# Patient Record
Sex: MALE | Race: WHITE | NOT HISPANIC OR LATINO | Employment: FULL TIME | URBAN - METROPOLITAN AREA
[De-identification: names, ages, dates, MRNs, and addresses within clinical notes are randomized per-mention and may not be internally consistent; named-entity substitution may affect disease eponyms.]

---

## 2017-04-21 ENCOUNTER — CLINICAL SUPPORT (OUTPATIENT)
Dept: INTERNAL MEDICINE | Facility: CLINIC | Age: 37
End: 2017-04-21
Payer: COMMERCIAL

## 2017-04-21 ENCOUNTER — OFFICE VISIT (OUTPATIENT)
Dept: INTERNAL MEDICINE | Facility: CLINIC | Age: 37
End: 2017-04-21

## 2017-04-21 VITALS
RESPIRATION RATE: 16 BRPM | SYSTOLIC BLOOD PRESSURE: 102 MMHG | TEMPERATURE: 97 F | HEART RATE: 62 BPM | HEIGHT: 65 IN | BODY MASS INDEX: 26.08 KG/M2 | DIASTOLIC BLOOD PRESSURE: 66 MMHG | WEIGHT: 156.5 LBS

## 2017-04-21 DIAGNOSIS — Z00.00 ANNUAL PHYSICAL EXAM: Primary | ICD-10-CM

## 2017-04-21 DIAGNOSIS — Z00.00 ROUTINE GENERAL MEDICAL EXAMINATION AT A HEALTH CARE FACILITY: Primary | ICD-10-CM

## 2017-04-21 LAB
ALBUMIN SERPL BCP-MCNC: 3.6 G/DL
ALP SERPL-CCNC: 69 U/L
ALT SERPL W/O P-5'-P-CCNC: 31 U/L
ANION GAP SERPL CALC-SCNC: 9 MMOL/L
AST SERPL-CCNC: 23 U/L
BILIRUB SERPL-MCNC: 0.6 MG/DL
BUN SERPL-MCNC: 13 MG/DL
CALCIUM SERPL-MCNC: 9.1 MG/DL
CHLORIDE SERPL-SCNC: 103 MMOL/L
CHOLEST/HDLC SERPL: 3.6 {RATIO}
CO2 SERPL-SCNC: 28 MMOL/L
CREAT SERPL-MCNC: 1.1 MG/DL
ERYTHROCYTE [DISTWIDTH] IN BLOOD BY AUTOMATED COUNT: 12.5 %
EST. GFR  (AFRICAN AMERICAN): >60 ML/MIN/1.73 M^2
EST. GFR  (NON AFRICAN AMERICAN): >60 ML/MIN/1.73 M^2
GLUCOSE SERPL-MCNC: 87 MG/DL
HCT VFR BLD AUTO: 44 %
HDL/CHOLESTEROL RATIO: 28 %
HDLC SERPL-MCNC: 100 MG/DL
HDLC SERPL-MCNC: 28 MG/DL
HGB BLD-MCNC: 15 G/DL
LDLC SERPL CALC-MCNC: 55.6 MG/DL
MCH RBC QN AUTO: 33.6 PG
MCHC RBC AUTO-ENTMCNC: 34.1 %
MCV RBC AUTO: 98 FL
NONHDLC SERPL-MCNC: 72 MG/DL
PLATELET # BLD AUTO: 166 K/UL
PMV BLD AUTO: 10.2 FL
POTASSIUM SERPL-SCNC: 3.7 MMOL/L
PROT SERPL-MCNC: 6.7 G/DL
RBC # BLD AUTO: 4.47 M/UL
SODIUM SERPL-SCNC: 140 MMOL/L
TRIGL SERPL-MCNC: 82 MG/DL
WBC # BLD AUTO: 5.43 K/UL

## 2017-04-21 PROCEDURE — 80053 COMPREHEN METABOLIC PANEL: CPT | Mod: PO

## 2017-04-21 PROCEDURE — 99396 PREV VISIT EST AGE 40-64: CPT | Mod: ,,, | Performed by: FAMILY MEDICINE

## 2017-04-21 PROCEDURE — 80061 LIPID PANEL: CPT | Mod: PO

## 2017-04-21 PROCEDURE — 99999 PR PBB SHADOW E&M-EST. PATIENT-LVL III: CPT | Mod: PBBFAC,,, | Performed by: FAMILY MEDICINE

## 2017-04-21 PROCEDURE — 85027 COMPLETE CBC AUTOMATED: CPT | Mod: PO

## 2017-04-21 PROCEDURE — 83036 HEMOGLOBIN GLYCOSYLATED A1C: CPT

## 2017-04-21 NOTE — PROGRESS NOTES
Subjective:       Patient ID: Ric Ramos III is a 37 y.o. male.    Chief Complaint: Executive Health and Generalized Body Aches    HPI Comments: Annual exam:       Pt is here for executive physical. Pt is a healthy male. Has at recent bout of stomach upset after eating Chinese food. No vomiting or BM problems.    Review of Systems   Constitutional: Negative.    HENT: Negative.    Respiratory: Negative.    Cardiovascular: Negative.    Gastrointestinal: Negative.    Skin: Negative.    Neurological: Negative.    Psychiatric/Behavioral: Negative.        Objective:      Physical Exam   Constitutional: He is oriented to person, place, and time. He appears well-developed and well-nourished.   HENT:   Head: Normocephalic.   Eyes: EOM are normal. Pupils are equal, round, and reactive to light.   Neck: Normal range of motion. Neck supple. No JVD present. No thyromegaly present.   Cardiovascular: Normal rate and regular rhythm.    Pulmonary/Chest: Effort normal and breath sounds normal.   Abdominal: Soft. Bowel sounds are normal.   Musculoskeletal: Normal range of motion.   Lymphadenopathy:     He has no cervical adenopathy.   Neurological: He is alert and oriented to person, place, and time. He has normal reflexes.   Skin: Skin is warm and dry.   Psychiatric: He has a normal mood and affect. His behavior is normal.       Assessment:       1. Annual physical exam        Plan:       Annual physical exam  Comments:  Pt is generally good healthy

## 2017-04-21 NOTE — MR AVS SNAPSHOT
"    [facility]  [address] [facility] [address] [place] [address]  Phone: [phone]  Fax: [phone]                  Ric Ramos III   2017 11:40 AM   Office Visit    Description:  Male : 1980   Provider:  Orlin Olsen MD   Department:  [facility]           Reason for Visit     Executive Health     Generalized Body Aches           Diagnoses this Visit        Comments    Annual physical exam    -  Primary Pt is generally good healthy           To Do List           Future Appointments        Provider Department Dept Phone    2017 11:40 AM Orlin Olsen MD [facility] Internal Medicine [phone]      Goals (5 Years of Data)     None      [facility]     [facility] On Call Nurse Care Line -  Assistance  Unless otherwise directed by your provider, please contact [facility], our nurse care line that is available for  assistance.     Registered nurses in the [facility] On Call Center provide: appointment scheduling, clinical advisement, health education, and other advisory services.  Call: [phone] (toll free)               Medications           Message regarding Medications     Verify the changes and/or additions to your medication regime listed below are the same as discussed with your clinician today.  If any of these changes or additions are incorrect, please notify your healthcare provider.             Verify that the below list of medications is an accurate representation of the medications you are currently taking.  If none reported, the list may be blank. If incorrect, please contact your healthcare provider. Carry this list with you in case of emergency.                Clinical Reference Information           Your Vitals Were     BP Pulse Temp Resp Height Weight    102/66 62 96.6 °F (35.9 °C) (Tympanic) 16 5' 5" (1.651 m) 71 kg (156 lb 8.4 oz)    BMI                26.05 kg/m2          Blood Pressure          Most Recent Value    BP  " 102/66      Allergies as of 4/21/2017     No Known Allergies      Immunizations Administered on Date of Encounter - 4/21/2017     None      [facility] Sign-Up     Activating your [facility] account is as easy as 1-2-3!     1) Visit [email or web address], select Sign Up Now, enter this activation code and your date of birth, then select Next.  [email or web address]  Expires: 6/5/2017 10:53 AM      2) Create a username and password to use when you visit [facility] in the future and select a security question in case you lose your password and select Next.    3) Enter your e-mail address and click Sign Up!    Additional Information  If you have questions, please e-mail [email or web address] or call [phone] to talk to our [facility] staff. Remember, [facility] is NOT to be used for urgent needs. For medical emergencies, dial 911.         Language Assistance Services     ATTENTION: Language assistance services are available, free of charge. Please call [phone].      ATENCIÓN: Si habla español, tiene a tyler disposición servicios gratuitos de asistencia lingüística. Llame al [phone].     CHÚ Ý: N?u b?n nói Ti?ng Vi?t, có các d?ch v? h? tr? ngôn ng? mi?n phí dành cho b?n. G?i s? [phone].         [facility] complies with applicable Federal civil rights laws and does not discriminate on the basis of race, color, national origin, age, disability, or sex.

## 2017-04-22 LAB
ESTIMATED AVG GLUCOSE: 103 MG/DL
HBA1C MFR BLD HPLC: 5.2 %

## 2017-07-24 PROBLEM — Z00.00 ANNUAL PHYSICAL EXAM: Status: RESOLVED | Noted: 2017-04-21 | Resolved: 2017-07-24

## 2018-01-22 DIAGNOSIS — Z00.00 ROUTINE GENERAL MEDICAL EXAMINATION AT A HEALTH CARE FACILITY: Primary | ICD-10-CM

## 2018-03-29 ENCOUNTER — OFFICE VISIT (OUTPATIENT)
Dept: INTERNAL MEDICINE | Facility: CLINIC | Age: 38
End: 2018-03-29

## 2018-03-29 ENCOUNTER — CLINICAL SUPPORT (OUTPATIENT)
Dept: CARDIOLOGY | Facility: CLINIC | Age: 38
End: 2018-03-29

## 2018-03-29 ENCOUNTER — CLINICAL SUPPORT (OUTPATIENT)
Dept: INTERNAL MEDICINE | Facility: CLINIC | Age: 38
End: 2018-03-29
Payer: COMMERCIAL

## 2018-03-29 VITALS
HEART RATE: 58 BPM | BODY MASS INDEX: 25.71 KG/M2 | SYSTOLIC BLOOD PRESSURE: 116 MMHG | WEIGHT: 154.31 LBS | DIASTOLIC BLOOD PRESSURE: 78 MMHG | BODY MASS INDEX: 25.71 KG/M2 | HEIGHT: 65 IN | HEART RATE: 58 BPM | SYSTOLIC BLOOD PRESSURE: 116 MMHG | TEMPERATURE: 97 F | RESPIRATION RATE: 16 BRPM | WEIGHT: 154.31 LBS | HEIGHT: 65 IN | DIASTOLIC BLOOD PRESSURE: 78 MMHG

## 2018-03-29 DIAGNOSIS — Z00.00 ANNUAL PHYSICAL EXAM: Primary | ICD-10-CM

## 2018-03-29 DIAGNOSIS — Z00.00 ROUTINE GENERAL MEDICAL EXAMINATION AT A HEALTH CARE FACILITY: ICD-10-CM

## 2018-03-29 DIAGNOSIS — Z00.00 ROUTINE GENERAL MEDICAL EXAMINATION AT A HEALTH CARE FACILITY: Primary | ICD-10-CM

## 2018-03-29 LAB
ALBUMIN SERPL BCP-MCNC: 3.9 G/DL
ALP SERPL-CCNC: 64 U/L
ALT SERPL W/O P-5'-P-CCNC: 41 U/L
ANION GAP SERPL CALC-SCNC: 11 MMOL/L
AST SERPL-CCNC: 47 U/L
BILIRUB SERPL-MCNC: 1 MG/DL
BUN SERPL-MCNC: 14 MG/DL
CALCIUM SERPL-MCNC: 10 MG/DL
CHLORIDE SERPL-SCNC: 104 MMOL/L
CHOLEST SERPL-MCNC: 147 MG/DL
CHOLEST/HDLC SERPL: 3.7 {RATIO}
CO2 SERPL-SCNC: 29 MMOL/L
CREAT SERPL-MCNC: 1.1 MG/DL
ERYTHROCYTE [DISTWIDTH] IN BLOOD BY AUTOMATED COUNT: 12.6 %
EST. GFR  (AFRICAN AMERICAN): >60 ML/MIN/1.73 M^2
EST. GFR  (NON AFRICAN AMERICAN): >60 ML/MIN/1.73 M^2
ESTIMATED AVG GLUCOSE: 94 MG/DL
GLUCOSE SERPL-MCNC: 91 MG/DL
HBA1C MFR BLD HPLC: 4.9 %
HCT VFR BLD AUTO: 44.4 %
HDLC SERPL-MCNC: 40 MG/DL
HDLC SERPL: 27.2 %
HGB BLD-MCNC: 15.5 G/DL
LDLC SERPL CALC-MCNC: 91.4 MG/DL
MCH RBC QN AUTO: 34.1 PG
MCHC RBC AUTO-ENTMCNC: 34.9 G/DL
MCV RBC AUTO: 98 FL
NONHDLC SERPL-MCNC: 107 MG/DL
PLATELET # BLD AUTO: 234 K/UL
PMV BLD AUTO: 9.9 FL
POTASSIUM SERPL-SCNC: 4.2 MMOL/L
PROT SERPL-MCNC: 7.1 G/DL
RBC # BLD AUTO: 4.54 M/UL
SODIUM SERPL-SCNC: 144 MMOL/L
TRIGL SERPL-MCNC: 78 MG/DL
WBC # BLD AUTO: 6.52 K/UL

## 2018-03-29 PROCEDURE — 99396 PREV VISIT EST AGE 40-64: CPT | Mod: ,,, | Performed by: FAMILY MEDICINE

## 2018-03-29 PROCEDURE — 85027 COMPLETE CBC AUTOMATED: CPT | Mod: PO

## 2018-03-29 PROCEDURE — 93000 ELECTROCARDIOGRAM COMPLETE: CPT | Mod: ,,, | Performed by: INTERNAL MEDICINE

## 2018-03-29 PROCEDURE — 99999 PR PBB SHADOW E&M-EST. PATIENT-LVL III: CPT | Mod: PBBFAC,,, | Performed by: FAMILY MEDICINE

## 2018-03-29 PROCEDURE — 83036 HEMOGLOBIN GLYCOSYLATED A1C: CPT

## 2018-03-29 PROCEDURE — 80053 COMPREHEN METABOLIC PANEL: CPT | Mod: PO

## 2018-03-29 PROCEDURE — 80061 LIPID PANEL: CPT | Mod: PO

## 2018-03-29 NOTE — PROGRESS NOTES
Subjective:       Patient ID: Ric Ramos III is a 38 y.o. male.    Chief Complaint: Annual Exam    Annual exam:       Pt is a 38 year old healthy male. Pt has no medical issues      Review of Systems   Constitutional: Negative.    HENT: Negative.    Respiratory: Negative.    Cardiovascular: Negative.    Gastrointestinal: Negative.    Genitourinary: Negative.    Skin: Negative.    Neurological: Negative.    Psychiatric/Behavioral: Negative.        Objective:      Physical Exam   Constitutional: He is oriented to person, place, and time. He appears well-developed and well-nourished.   HENT:   Head: Normocephalic.   Eyes: EOM are normal. Pupils are equal, round, and reactive to light.   Neck: Normal range of motion. Neck supple. No JVD present. No thyromegaly present.   Cardiovascular: Normal rate and regular rhythm.    No murmur heard.  Pulmonary/Chest: Effort normal and breath sounds normal.   Abdominal: Soft. Bowel sounds are normal.   Musculoskeletal: Normal range of motion.   Lymphadenopathy:     He has no cervical adenopathy.   Neurological: He is alert and oriented to person, place, and time. He has normal reflexes.   Skin: Skin is warm and dry.   Psychiatric: He has a normal mood and affect. His behavior is normal.       Assessment:       1. Annual physical exam        Plan:       Annual physical exam  Comments:  Pt is generally good health and doing well

## 2018-07-02 PROBLEM — Z00.00 ANNUAL PHYSICAL EXAM: Status: RESOLVED | Noted: 2017-04-21 | Resolved: 2018-07-02

## 2019-07-12 ENCOUNTER — CLINICAL SUPPORT (OUTPATIENT)
Dept: INTERNAL MEDICINE | Facility: CLINIC | Age: 39
End: 2019-07-12

## 2019-07-12 ENCOUNTER — OFFICE VISIT (OUTPATIENT)
Dept: INTERNAL MEDICINE | Facility: CLINIC | Age: 39
End: 2019-07-12

## 2019-07-12 VITALS
WEIGHT: 156.5 LBS | SYSTOLIC BLOOD PRESSURE: 108 MMHG | BODY MASS INDEX: 26.08 KG/M2 | HEIGHT: 65 IN | RESPIRATION RATE: 16 BRPM | HEART RATE: 56 BPM | TEMPERATURE: 97 F | DIASTOLIC BLOOD PRESSURE: 73 MMHG

## 2019-07-12 VITALS
RESPIRATION RATE: 16 BRPM | HEART RATE: 56 BPM | BODY MASS INDEX: 26.08 KG/M2 | SYSTOLIC BLOOD PRESSURE: 108 MMHG | WEIGHT: 156.5 LBS | HEIGHT: 65 IN | DIASTOLIC BLOOD PRESSURE: 73 MMHG

## 2019-07-12 DIAGNOSIS — Z00.00 ANNUAL PHYSICAL EXAM: Primary | ICD-10-CM

## 2019-07-12 DIAGNOSIS — Z00.00 ROUTINE GENERAL MEDICAL EXAMINATION AT A HEALTH CARE FACILITY: Primary | ICD-10-CM

## 2019-07-12 LAB
ALBUMIN SERPL BCP-MCNC: 4 G/DL (ref 3.5–5.2)
ALP SERPL-CCNC: 57 U/L (ref 55–135)
ALT SERPL W/O P-5'-P-CCNC: 31 U/L (ref 10–44)
ANION GAP SERPL CALC-SCNC: 8 MMOL/L (ref 8–16)
AST SERPL-CCNC: 31 U/L (ref 10–40)
BILIRUB SERPL-MCNC: 1.3 MG/DL (ref 0.1–1)
BUN SERPL-MCNC: 17 MG/DL (ref 6–20)
CALCIUM SERPL-MCNC: 10.1 MG/DL (ref 8.7–10.5)
CHLORIDE SERPL-SCNC: 105 MMOL/L (ref 95–110)
CHOLEST SERPL-MCNC: 165 MG/DL (ref 120–199)
CHOLEST/HDLC SERPL: 3.4 {RATIO} (ref 2–5)
CO2 SERPL-SCNC: 28 MMOL/L (ref 23–29)
CREAT SERPL-MCNC: 1.2 MG/DL (ref 0.5–1.4)
ERYTHROCYTE [DISTWIDTH] IN BLOOD BY AUTOMATED COUNT: 12.4 % (ref 11.5–14.5)
EST. GFR  (AFRICAN AMERICAN): >60 ML/MIN/1.73 M^2
EST. GFR  (NON AFRICAN AMERICAN): >60 ML/MIN/1.73 M^2
ESTIMATED AVG GLUCOSE: 94 MG/DL (ref 68–131)
GLUCOSE SERPL-MCNC: 78 MG/DL (ref 70–110)
HBA1C MFR BLD HPLC: 4.9 % (ref 4–5.6)
HCT VFR BLD AUTO: 43.8 % (ref 40–54)
HDLC SERPL-MCNC: 48 MG/DL (ref 40–75)
HDLC SERPL: 29.1 % (ref 20–50)
HGB BLD-MCNC: 15.1 G/DL (ref 14–18)
LDLC SERPL CALC-MCNC: 102 MG/DL (ref 63–159)
MCH RBC QN AUTO: 34.4 PG (ref 27–31)
MCHC RBC AUTO-ENTMCNC: 34.5 G/DL (ref 32–36)
MCV RBC AUTO: 100 FL (ref 82–98)
NONHDLC SERPL-MCNC: 117 MG/DL
PLATELET # BLD AUTO: 211 K/UL (ref 150–350)
PMV BLD AUTO: 10.2 FL (ref 9.2–12.9)
POTASSIUM SERPL-SCNC: 3.6 MMOL/L (ref 3.5–5.1)
PROT SERPL-MCNC: 7.2 G/DL (ref 6–8.4)
RBC # BLD AUTO: 4.39 M/UL (ref 4.6–6.2)
SODIUM SERPL-SCNC: 141 MMOL/L (ref 136–145)
TRIGL SERPL-MCNC: 75 MG/DL (ref 30–150)
TSH SERPL DL<=0.005 MIU/L-ACNC: 2.36 UIU/ML (ref 0.4–4)
WBC # BLD AUTO: 5.61 K/UL (ref 3.9–12.7)

## 2019-07-12 PROCEDURE — 99999 PR PBB SHADOW E&M-EST. PATIENT-LVL III: ICD-10-PCS | Mod: PBBFAC,,, | Performed by: FAMILY MEDICINE

## 2019-07-12 PROCEDURE — 85027 COMPLETE CBC AUTOMATED: CPT

## 2019-07-12 PROCEDURE — 80061 LIPID PANEL: CPT

## 2019-07-12 PROCEDURE — 84443 ASSAY THYROID STIM HORMONE: CPT

## 2019-07-12 PROCEDURE — 99999 PR PBB SHADOW E&M-EST. PATIENT-LVL III: CPT | Mod: PBBFAC,,, | Performed by: FAMILY MEDICINE

## 2019-07-12 PROCEDURE — 99386 PR PREVENTIVE VISIT,NEW,40-64: ICD-10-PCS | Mod: S$GLB,,, | Performed by: FAMILY MEDICINE

## 2019-07-12 PROCEDURE — 83036 HEMOGLOBIN GLYCOSYLATED A1C: CPT

## 2019-07-12 PROCEDURE — 99386 PREV VISIT NEW AGE 40-64: CPT | Mod: S$GLB,,, | Performed by: FAMILY MEDICINE

## 2019-07-12 PROCEDURE — 80053 COMPREHEN METABOLIC PANEL: CPT

## 2019-07-12 RX ORDER — ESZOPICLONE 2 MG/1
TABLET, FILM COATED ORAL
COMMUNITY
Start: 2018-04-09 | End: 2021-07-06

## 2019-10-14 PROBLEM — Z00.00 ANNUAL PHYSICAL EXAM: Status: RESOLVED | Noted: 2017-04-21 | Resolved: 2019-10-14

## 2020-02-26 DIAGNOSIS — Z91.89 AT RISK FOR CORONARY ARTERY DISEASE: ICD-10-CM

## 2020-02-26 DIAGNOSIS — Z00.00 ROUTINE GENERAL MEDICAL EXAMINATION AT A HEALTH CARE FACILITY: Primary | ICD-10-CM

## 2020-06-01 ENCOUNTER — OFFICE VISIT (OUTPATIENT)
Dept: INTERNAL MEDICINE | Facility: CLINIC | Age: 40
End: 2020-06-01

## 2020-06-01 ENCOUNTER — HOSPITAL ENCOUNTER (OUTPATIENT)
Dept: CARDIOLOGY | Facility: HOSPITAL | Age: 40
Discharge: HOME | End: 2020-06-01

## 2020-06-01 ENCOUNTER — CLINICAL SUPPORT (OUTPATIENT)
Dept: INTERNAL MEDICINE | Facility: CLINIC | Age: 40
End: 2020-06-01
Payer: COMMERCIAL

## 2020-06-01 VITALS
WEIGHT: 163.13 LBS | OXYGEN SATURATION: 98 % | DIASTOLIC BLOOD PRESSURE: 69 MMHG | HEIGHT: 65 IN | SYSTOLIC BLOOD PRESSURE: 116 MMHG | HEART RATE: 56 BPM | BODY MASS INDEX: 27.18 KG/M2 | TEMPERATURE: 97 F

## 2020-06-01 DIAGNOSIS — Z00.00 ANNUAL PHYSICAL EXAM: Primary | ICD-10-CM

## 2020-06-01 DIAGNOSIS — Z00.00 ROUTINE GENERAL MEDICAL EXAMINATION AT A HEALTH CARE FACILITY: ICD-10-CM

## 2020-06-01 DIAGNOSIS — R94.31 EKG ABNORMALITY: ICD-10-CM

## 2020-06-01 DIAGNOSIS — Z00.00 ROUTINE GENERAL MEDICAL EXAMINATION AT A HEALTH CARE FACILITY: Primary | ICD-10-CM

## 2020-06-01 LAB
ALBUMIN SERPL BCP-MCNC: 3.8 G/DL (ref 3.5–5.2)
ALP SERPL-CCNC: 50 U/L (ref 55–135)
ALT SERPL W/O P-5'-P-CCNC: 44 U/L (ref 10–44)
ANION GAP SERPL CALC-SCNC: 9 MMOL/L (ref 8–16)
AST SERPL-CCNC: 35 U/L (ref 10–40)
BILIRUB SERPL-MCNC: 0.6 MG/DL (ref 0.1–1)
BUN SERPL-MCNC: 19 MG/DL (ref 6–20)
CALCIUM SERPL-MCNC: 9.4 MG/DL (ref 8.7–10.5)
CHLORIDE SERPL-SCNC: 104 MMOL/L (ref 95–110)
CHOLEST SERPL-MCNC: 171 MG/DL (ref 120–199)
CHOLEST/HDLC SERPL: 3.6 {RATIO} (ref 2–5)
CO2 SERPL-SCNC: 28 MMOL/L (ref 23–29)
CREAT SERPL-MCNC: 1.2 MG/DL (ref 0.5–1.4)
ERYTHROCYTE [DISTWIDTH] IN BLOOD BY AUTOMATED COUNT: 12.6 % (ref 11.5–14.5)
EST. GFR  (AFRICAN AMERICAN): >60 ML/MIN/1.73 M^2
EST. GFR  (NON AFRICAN AMERICAN): >60 ML/MIN/1.73 M^2
ESTIMATED AVG GLUCOSE: 97 MG/DL (ref 68–131)
GLUCOSE SERPL-MCNC: 83 MG/DL (ref 70–110)
HBA1C MFR BLD HPLC: 5 % (ref 4–5.6)
HCT VFR BLD AUTO: 36.5 % (ref 40–54)
HDLC SERPL-MCNC: 47 MG/DL (ref 40–75)
HDLC SERPL: 27.5 % (ref 20–50)
HGB BLD-MCNC: 11.7 G/DL (ref 14–18)
LDLC SERPL CALC-MCNC: 106.6 MG/DL (ref 63–159)
MCH RBC QN AUTO: 29.3 PG (ref 27–31)
MCHC RBC AUTO-ENTMCNC: 32.1 G/DL (ref 32–36)
MCV RBC AUTO: 91 FL (ref 82–98)
NONHDLC SERPL-MCNC: 124 MG/DL
PLATELET # BLD AUTO: 267 K/UL (ref 150–350)
PMV BLD AUTO: 9.8 FL (ref 9.2–12.9)
POTASSIUM SERPL-SCNC: 3.9 MMOL/L (ref 3.5–5.1)
PROT SERPL-MCNC: 6.8 G/DL (ref 6–8.4)
RBC # BLD AUTO: 4 M/UL (ref 4.6–6.2)
SODIUM SERPL-SCNC: 141 MMOL/L (ref 136–145)
TRIGL SERPL-MCNC: 87 MG/DL (ref 30–150)
TSH SERPL DL<=0.005 MIU/L-ACNC: 2.5 UIU/ML (ref 0.4–4)
WBC # BLD AUTO: 4.66 K/UL (ref 3.9–12.7)

## 2020-06-01 PROCEDURE — 93005 ELECTROCARDIOGRAM TRACING: CPT

## 2020-06-01 PROCEDURE — 93010 ELECTROCARDIOGRAM REPORT: CPT | Mod: ,,, | Performed by: INTERNAL MEDICINE

## 2020-06-01 PROCEDURE — 80061 LIPID PANEL: CPT

## 2020-06-01 PROCEDURE — 93010 EKG 12-LEAD: ICD-10-PCS | Mod: ,,, | Performed by: INTERNAL MEDICINE

## 2020-06-01 PROCEDURE — 99385 PREV VISIT NEW AGE 18-39: CPT | Mod: S$GLB,,, | Performed by: FAMILY MEDICINE

## 2020-06-01 PROCEDURE — 80053 COMPREHEN METABOLIC PANEL: CPT

## 2020-06-01 PROCEDURE — 99999 PR PBB SHADOW E&M-EST. PATIENT-LVL III: CPT | Mod: PBBFAC,,, | Performed by: FAMILY MEDICINE

## 2020-06-01 PROCEDURE — 84443 ASSAY THYROID STIM HORMONE: CPT

## 2020-06-01 PROCEDURE — 85027 COMPLETE CBC AUTOMATED: CPT

## 2020-06-01 PROCEDURE — 83036 HEMOGLOBIN GLYCOSYLATED A1C: CPT

## 2020-06-01 PROCEDURE — 99999 PR PBB SHADOW E&M-EST. PATIENT-LVL III: ICD-10-PCS | Mod: PBBFAC,,, | Performed by: FAMILY MEDICINE

## 2020-06-01 PROCEDURE — 99385 PR PREVENTIVE VISIT,NEW,18-39: ICD-10-PCS | Mod: S$GLB,,, | Performed by: FAMILY MEDICINE

## 2020-06-01 NOTE — PROGRESS NOTES
Subjective:       Patient ID: Ric Ramos III is a 40 y.o. male.    Chief Complaint: Executive Health    Pt is a 40 year old who is generally healthy without any major medical issues.    Review of Systems   Constitutional: Negative.    Respiratory: Negative.    Cardiovascular: Negative.    Gastrointestinal: Negative.    Musculoskeletal: Negative.    Psychiatric/Behavioral: Negative.        Objective:      Physical Exam   Constitutional: He is oriented to person, place, and time. He appears well-developed and well-nourished.   HENT:   Head: Normocephalic.   Eyes: Pupils are equal, round, and reactive to light. EOM are normal.   Neck: Normal range of motion. Neck supple. No JVD present. No thyromegaly present.   Cardiovascular: Normal rate and regular rhythm.   Pulmonary/Chest: Effort normal and breath sounds normal.   Abdominal: Soft. Bowel sounds are normal.   Musculoskeletal: Normal range of motion.   Lymphadenopathy:     He has no cervical adenopathy.   Neurological: He is alert and oriented to person, place, and time. He has normal reflexes.   Skin: Skin is warm and dry.   Psychiatric: He has a normal mood and affect. His behavior is normal.       Assessment:       1. Annual physical exam    2. EKG abnormality        Plan:       Annual physical exam  Comments:  Over all very healthy    EKG abnormality  Comments:  Think EKG is over read.Repol with a very slow HR. Will consult Cardiology

## 2020-06-01 NOTE — Clinical Note
Pt is a healthy 40 year old who has on multiple occassions had incomplete right bundle. Cross-fit in great shape. No symptoms. This EKG shows ST -elevations, repol vs pericarditis. Think this is simply over read with HR so low likely repol. Do you feel further eval?

## 2020-06-02 ENCOUNTER — TELEPHONE (OUTPATIENT)
Dept: INTERNAL MEDICINE | Facility: CLINIC | Age: 40
End: 2020-06-02

## 2020-06-02 NOTE — TELEPHONE ENCOUNTER
I s/w pt informing him that Dr. Olsen spoke with cardiology and they stated no concerns at this time. //BJ

## 2020-06-02 NOTE — TELEPHONE ENCOUNTER
----- Message from Orlin Olsen MD sent at 6/2/2020  8:59 AM CDT -----  Inform pt that cardiology feels no concerns  ----- Message -----  From: Elio Perez MD  Sent: 6/2/2020   6:45 AM CDT  To: Orlin Olsen MD    Should be ok    ----- Message -----  From: Orlin Olsen MD  Sent: 6/2/2020   3:31 AM CDT  To: Elio Perez MD    None, Dad is 70 with heart issues but nothing early onset  ----- Message -----  From: Elio Perez MD  Sent: 6/1/2020  10:06 PM CDT  To: Orlin Olsen MD    THE V2 OF EKG LOOKS FUNNY. CAN NOT R/O TYPE III BRUGADA.  ANY F/H PREMATURE SCD?  ----- Message -----  From: Orlin Olsen MD  Sent: 6/1/2020   8:37 AM CDT  To: Elio Perez MD    Pt is a healthy 40 year old who has on multiple occassions had incomplete right bundle. Cross-fit in great shape. No symptoms. This EKG shows ST -elevations, repol vs pericarditis. Think this is simply over read with HR so low likely repol. Do you feel further eval?

## 2020-08-31 PROBLEM — Z00.00 ANNUAL PHYSICAL EXAM: Status: RESOLVED | Noted: 2017-04-21 | Resolved: 2020-08-31

## 2021-05-10 ENCOUNTER — PATIENT MESSAGE (OUTPATIENT)
Dept: RESEARCH | Facility: HOSPITAL | Age: 41
End: 2021-05-10

## 2021-06-14 DIAGNOSIS — Z00.00 ROUTINE GENERAL MEDICAL EXAMINATION AT A HEALTH CARE FACILITY: Primary | ICD-10-CM

## 2021-07-06 ENCOUNTER — CLINICAL SUPPORT (OUTPATIENT)
Dept: INTERNAL MEDICINE | Facility: CLINIC | Age: 41
End: 2021-07-06

## 2021-07-06 ENCOUNTER — CLINICAL SUPPORT (OUTPATIENT)
Dept: INTERNAL MEDICINE | Facility: CLINIC | Age: 41
End: 2021-07-06
Payer: COMMERCIAL

## 2021-07-06 ENCOUNTER — HOSPITAL ENCOUNTER (OUTPATIENT)
Dept: CARDIOLOGY | Facility: HOSPITAL | Age: 41
Discharge: HOME | End: 2021-07-06
Attending: FAMILY MEDICINE

## 2021-07-06 ENCOUNTER — OFFICE VISIT (OUTPATIENT)
Dept: INTERNAL MEDICINE | Facility: CLINIC | Age: 41
End: 2021-07-06

## 2021-07-06 VITALS
BODY MASS INDEX: 26.45 KG/M2 | HEIGHT: 65 IN | TEMPERATURE: 97 F | DIASTOLIC BLOOD PRESSURE: 77 MMHG | WEIGHT: 158.75 LBS | OXYGEN SATURATION: 98 % | HEART RATE: 69 BPM | SYSTOLIC BLOOD PRESSURE: 127 MMHG

## 2021-07-06 DIAGNOSIS — Z28.21 IMMUNIZATION NOT CARRIED OUT BECAUSE OF PATIENT REFUSAL: ICD-10-CM

## 2021-07-06 DIAGNOSIS — Z11.59 ENCOUNTER FOR HEPATITIS C SCREENING TEST FOR LOW RISK PATIENT: ICD-10-CM

## 2021-07-06 DIAGNOSIS — I45.10 INCOMPLETE RIGHT BUNDLE BRANCH BLOCK (RBBB): ICD-10-CM

## 2021-07-06 DIAGNOSIS — Z00.00 PREVENTATIVE HEALTH CARE: Primary | ICD-10-CM

## 2021-07-06 DIAGNOSIS — Z23 NEED FOR DIPHTHERIA-TETANUS-PERTUSSIS (TDAP) VACCINE: ICD-10-CM

## 2021-07-06 DIAGNOSIS — Z00.00 ROUTINE GENERAL MEDICAL EXAMINATION AT A HEALTH CARE FACILITY: ICD-10-CM

## 2021-07-06 DIAGNOSIS — Z00.00 PREVENTATIVE HEALTH CARE: ICD-10-CM

## 2021-07-06 DIAGNOSIS — Z00.00 ROUTINE GENERAL MEDICAL EXAMINATION AT A HEALTH CARE FACILITY: Primary | ICD-10-CM

## 2021-07-06 DIAGNOSIS — L21.9 SEBORRHEIC DERMATITIS: Chronic | ICD-10-CM

## 2021-07-06 DIAGNOSIS — E87.6 LOW SERUM POTASSIUM: ICD-10-CM

## 2021-07-06 LAB
ALBUMIN SERPL BCP-MCNC: 4 G/DL (ref 3.5–5.2)
ALP SERPL-CCNC: 65 U/L (ref 55–135)
ALT SERPL W/O P-5'-P-CCNC: 32 U/L (ref 10–44)
ANION GAP SERPL CALC-SCNC: 10 MMOL/L (ref 8–16)
AST SERPL-CCNC: 36 U/L (ref 10–40)
BILIRUB SERPL-MCNC: 1.1 MG/DL (ref 0.1–1)
BUN SERPL-MCNC: 19 MG/DL (ref 6–20)
CALCIUM SERPL-MCNC: 9.4 MG/DL (ref 8.7–10.5)
CHLORIDE SERPL-SCNC: 102 MMOL/L (ref 95–110)
CHOLEST SERPL-MCNC: 184 MG/DL (ref 120–199)
CHOLEST/HDLC SERPL: 3.6 {RATIO} (ref 2–5)
CO2 SERPL-SCNC: 30 MMOL/L (ref 23–29)
COMPLEXED PSA SERPL-MCNC: 0.42 NG/ML (ref 0–4)
CREAT SERPL-MCNC: 1.3 MG/DL (ref 0.5–1.4)
CV STRESS BASE HR: 69 BPM
DIASTOLIC BLOOD PRESSURE: 75 MMHG
ERYTHROCYTE [DISTWIDTH] IN BLOOD BY AUTOMATED COUNT: 12.4 % (ref 11.5–14.5)
EST. GFR  (AFRICAN AMERICAN): >60 ML/MIN/1.73 M^2
EST. GFR  (NON AFRICAN AMERICAN): >60 ML/MIN/1.73 M^2
GLUCOSE SERPL-MCNC: 88 MG/DL (ref 70–110)
HCT VFR BLD AUTO: 43.5 % (ref 40–54)
HDLC SERPL-MCNC: 51 MG/DL (ref 40–75)
HDLC SERPL: 27.7 % (ref 20–50)
HGB BLD-MCNC: 15.4 G/DL (ref 14–18)
LDLC SERPL CALC-MCNC: 123.2 MG/DL (ref 63–159)
MCH RBC QN AUTO: 35.1 PG (ref 27–31)
MCHC RBC AUTO-ENTMCNC: 35.4 G/DL (ref 32–36)
MCV RBC AUTO: 99 FL (ref 82–98)
NONHDLC SERPL-MCNC: 133 MG/DL
OHS CV CPX 1 MINUTE RECOVERY HEART RATE: 136 BPM
OHS CV CPX 85 PERCENT MAX PREDICTED HEART RATE MALE: 153
OHS CV CPX ESTIMATED METS: 13
OHS CV CPX MAX PREDICTED HEART RATE: 180
OHS CV CPX PATIENT IS FEMALE: 0
OHS CV CPX PATIENT IS MALE: 1
OHS CV CPX PEAK DIASTOLIC BLOOD PRESSURE: 87 MMHG
OHS CV CPX PEAK HEAR RATE: 160 BPM
OHS CV CPX PEAK RATE PRESSURE PRODUCT: NORMAL
OHS CV CPX PEAK SYSTOLIC BLOOD PRESSURE: 161 MMHG
OHS CV CPX PERCENT MAX PREDICTED HEART RATE ACHIEVED: 89
OHS CV CPX RATE PRESSURE PRODUCT PRESENTING: 8142
PLATELET # BLD AUTO: 208 K/UL (ref 150–450)
PMV BLD AUTO: 10 FL (ref 9.2–12.9)
POTASSIUM SERPL-SCNC: 3.4 MMOL/L (ref 3.5–5.1)
PROT SERPL-MCNC: 7.1 G/DL (ref 6–8.4)
RBC # BLD AUTO: 4.39 M/UL (ref 4.6–6.2)
SODIUM SERPL-SCNC: 142 MMOL/L (ref 136–145)
STRESS ECHO POST EXERCISE DUR MIN: 12 MINUTES
SYSTOLIC BLOOD PRESSURE: 118 MMHG
TRIGL SERPL-MCNC: 49 MG/DL (ref 30–150)
WBC # BLD AUTO: 4.46 K/UL (ref 3.9–12.7)

## 2021-07-06 PROCEDURE — 93016 CV STRESS TEST SUPVJ ONLY: CPT | Mod: ,,, | Performed by: INTERNAL MEDICINE

## 2021-07-06 PROCEDURE — 86803 HEPATITIS C AB TEST: CPT | Performed by: FAMILY MEDICINE

## 2021-07-06 PROCEDURE — 83036 HEMOGLOBIN GLYCOSYLATED A1C: CPT | Performed by: FAMILY MEDICINE

## 2021-07-06 PROCEDURE — 93018 EXERCISE STRESS - EKG (CUPID ONLY): ICD-10-PCS | Mod: ,,, | Performed by: INTERNAL MEDICINE

## 2021-07-06 PROCEDURE — 93017 CV STRESS TEST TRACING ONLY: CPT

## 2021-07-06 PROCEDURE — 99999 PR PBB SHADOW E&M-EST. PATIENT-LVL IV: CPT | Mod: PBBFAC,,, | Performed by: FAMILY MEDICINE

## 2021-07-06 PROCEDURE — 90715 TDAP VACCINE 7 YRS/> IM: CPT | Mod: S$GLB,,, | Performed by: FAMILY MEDICINE

## 2021-07-06 PROCEDURE — 99999 PR PBB SHADOW E&M-EST. PATIENT-LVL IV: ICD-10-PCS | Mod: PBBFAC,,, | Performed by: FAMILY MEDICINE

## 2021-07-06 PROCEDURE — 84443 ASSAY THYROID STIM HORMONE: CPT | Performed by: FAMILY MEDICINE

## 2021-07-06 PROCEDURE — 80053 COMPREHEN METABOLIC PANEL: CPT | Performed by: FAMILY MEDICINE

## 2021-07-06 PROCEDURE — 90471 IMMUNIZATION ADMIN: CPT | Mod: S$GLB,,, | Performed by: FAMILY MEDICINE

## 2021-07-06 PROCEDURE — 93016 EXERCISE STRESS - EKG (CUPID ONLY): ICD-10-PCS | Mod: ,,, | Performed by: INTERNAL MEDICINE

## 2021-07-06 PROCEDURE — 90715 TDAP VACCINE GREATER THAN OR EQUAL TO 7YO IM: ICD-10-PCS | Mod: S$GLB,,, | Performed by: FAMILY MEDICINE

## 2021-07-06 PROCEDURE — 80061 LIPID PANEL: CPT | Performed by: FAMILY MEDICINE

## 2021-07-06 PROCEDURE — 93018 CV STRESS TEST I&R ONLY: CPT | Mod: ,,, | Performed by: INTERNAL MEDICINE

## 2021-07-06 PROCEDURE — 90471 TDAP VACCINE GREATER THAN OR EQUAL TO 7YO IM: ICD-10-PCS | Mod: S$GLB,,, | Performed by: FAMILY MEDICINE

## 2021-07-06 PROCEDURE — 99396 PR PREVENTIVE VISIT,EST,40-64: ICD-10-PCS | Mod: 25,S$GLB,, | Performed by: FAMILY MEDICINE

## 2021-07-06 PROCEDURE — 99211 OFF/OP EST MAY X REQ PHY/QHP: CPT | Mod: S$GLB,,, | Performed by: INTERNAL MEDICINE

## 2021-07-06 PROCEDURE — 99211 PR NURSE VISIT, 15 MINS, EXEC HLTH ONLY: ICD-10-PCS | Mod: S$GLB,,, | Performed by: INTERNAL MEDICINE

## 2021-07-06 PROCEDURE — 99396 PREV VISIT EST AGE 40-64: CPT | Mod: 25,S$GLB,, | Performed by: FAMILY MEDICINE

## 2021-07-06 PROCEDURE — 85027 COMPLETE CBC AUTOMATED: CPT | Performed by: FAMILY MEDICINE

## 2021-07-06 PROCEDURE — 84153 ASSAY OF PSA TOTAL: CPT | Performed by: FAMILY MEDICINE

## 2021-07-06 RX ORDER — KETOCONAZOLE 20 MG/G
1 CREAM TOPICAL 2 TIMES DAILY
COMMUNITY
Start: 2021-04-08

## 2021-07-06 RX ORDER — CLINDAMYCIN PHOSPHATE 11.9 MG/ML
SOLUTION TOPICAL
COMMUNITY
Start: 2021-04-08

## 2021-07-07 LAB
ESTIMATED AVG GLUCOSE: 97 MG/DL (ref 68–131)
HBA1C MFR BLD: 5 % (ref 4–5.6)
HCV AB SERPL QL IA: NEGATIVE
TSH SERPL DL<=0.005 MIU/L-ACNC: 2.22 UIU/ML (ref 0.4–4)

## 2022-05-06 DIAGNOSIS — Z00.00 ROUTINE GENERAL MEDICAL EXAMINATION AT A HEALTH CARE FACILITY: Primary | ICD-10-CM

## 2022-06-17 ENCOUNTER — CLINICAL SUPPORT (OUTPATIENT)
Dept: INTERNAL MEDICINE | Facility: CLINIC | Age: 42
End: 2022-06-17

## 2022-06-17 ENCOUNTER — OFFICE VISIT (OUTPATIENT)
Dept: INTERNAL MEDICINE | Facility: CLINIC | Age: 42
End: 2022-06-17

## 2022-06-17 ENCOUNTER — HOSPITAL ENCOUNTER (OUTPATIENT)
Dept: RADIOLOGY | Facility: HOSPITAL | Age: 42
Discharge: HOME | End: 2022-06-17
Attending: FAMILY MEDICINE

## 2022-06-17 VITALS
BODY MASS INDEX: 26.08 KG/M2 | HEART RATE: 61 BPM | DIASTOLIC BLOOD PRESSURE: 80 MMHG | HEIGHT: 65 IN | WEIGHT: 156.5 LBS | TEMPERATURE: 97 F | OXYGEN SATURATION: 97 % | SYSTOLIC BLOOD PRESSURE: 122 MMHG

## 2022-06-17 DIAGNOSIS — Z00.00 ROUTINE GENERAL MEDICAL EXAMINATION AT A HEALTH CARE FACILITY: Primary | ICD-10-CM

## 2022-06-17 DIAGNOSIS — Z00.00 ROUTINE GENERAL MEDICAL EXAMINATION AT A HEALTH CARE FACILITY: ICD-10-CM

## 2022-06-17 DIAGNOSIS — Z00.00 PREVENTATIVE HEALTH CARE: Primary | ICD-10-CM

## 2022-06-17 DIAGNOSIS — Z28.21 IMMUNIZATION NOT CARRIED OUT BECAUSE OF PATIENT REFUSAL: Chronic | ICD-10-CM

## 2022-06-17 PROBLEM — E87.6 LOW SERUM POTASSIUM: Status: RESOLVED | Noted: 2021-07-06 | Resolved: 2022-06-17

## 2022-06-17 LAB
ALBUMIN SERPL BCP-MCNC: 4.2 G/DL (ref 3.5–5.2)
ALP SERPL-CCNC: 44 U/L (ref 55–135)
ALT SERPL W/O P-5'-P-CCNC: 31 U/L (ref 10–44)
ANION GAP SERPL CALC-SCNC: 10 MMOL/L (ref 8–16)
AST SERPL-CCNC: 31 U/L (ref 10–40)
BILIRUB SERPL-MCNC: 0.9 MG/DL (ref 0.1–1)
BUN SERPL-MCNC: 27 MG/DL (ref 6–20)
CALCIUM SERPL-MCNC: 9.7 MG/DL (ref 8.7–10.5)
CHLORIDE SERPL-SCNC: 105 MMOL/L (ref 95–110)
CHOLEST SERPL-MCNC: 183 MG/DL (ref 120–199)
CHOLEST/HDLC SERPL: 4.4 {RATIO} (ref 2–5)
CO2 SERPL-SCNC: 26 MMOL/L (ref 23–29)
COMPLEXED PSA SERPL-MCNC: 0.5 NG/ML (ref 0–4)
CREAT SERPL-MCNC: 1.4 MG/DL (ref 0.5–1.4)
ERYTHROCYTE [DISTWIDTH] IN BLOOD BY AUTOMATED COUNT: 12.1 % (ref 11.5–14.5)
EST. GFR  (AFRICAN AMERICAN): >60 ML/MIN/1.73 M^2
EST. GFR  (NON AFRICAN AMERICAN): >60 ML/MIN/1.73 M^2
ESTIMATED AVG GLUCOSE: 94 MG/DL (ref 68–131)
GLUCOSE SERPL-MCNC: 87 MG/DL (ref 70–110)
HBA1C MFR BLD: 4.9 % (ref 4–5.6)
HCT VFR BLD AUTO: 42.3 % (ref 40–54)
HDLC SERPL-MCNC: 42 MG/DL (ref 40–75)
HDLC SERPL: 23 % (ref 20–50)
HGB BLD-MCNC: 14.6 G/DL (ref 14–18)
LDLC SERPL CALC-MCNC: 125 MG/DL (ref 63–159)
MCH RBC QN AUTO: 34.3 PG (ref 27–31)
MCHC RBC AUTO-ENTMCNC: 34.5 G/DL (ref 32–36)
MCV RBC AUTO: 99 FL (ref 82–98)
NONHDLC SERPL-MCNC: 141 MG/DL
PLATELET # BLD AUTO: 223 K/UL (ref 150–450)
PMV BLD AUTO: 10 FL (ref 9.2–12.9)
POTASSIUM SERPL-SCNC: 3.9 MMOL/L (ref 3.5–5.1)
PROT SERPL-MCNC: 7 G/DL (ref 6–8.4)
RBC # BLD AUTO: 4.26 M/UL (ref 4.6–6.2)
SODIUM SERPL-SCNC: 141 MMOL/L (ref 136–145)
TRIGL SERPL-MCNC: 80 MG/DL (ref 30–150)
TSH SERPL DL<=0.005 MIU/L-ACNC: 2.21 UIU/ML (ref 0.4–4)
WBC # BLD AUTO: 4.71 K/UL (ref 3.9–12.7)

## 2022-06-17 PROCEDURE — 80053 COMPREHEN METABOLIC PANEL: CPT | Performed by: FAMILY MEDICINE

## 2022-06-17 PROCEDURE — 99999 PR PBB SHADOW E&M-EST. PATIENT-LVL IV: ICD-10-PCS | Mod: PBBFAC,,, | Performed by: FAMILY MEDICINE

## 2022-06-17 PROCEDURE — 99396 PREV VISIT EST AGE 40-64: CPT | Mod: S$GLB,,, | Performed by: FAMILY MEDICINE

## 2022-06-17 PROCEDURE — 83036 HEMOGLOBIN GLYCOSYLATED A1C: CPT | Performed by: FAMILY MEDICINE

## 2022-06-17 PROCEDURE — 80061 LIPID PANEL: CPT | Performed by: FAMILY MEDICINE

## 2022-06-17 PROCEDURE — 99396 PR PREVENTIVE VISIT,EST,40-64: ICD-10-PCS | Mod: S$GLB,,, | Performed by: FAMILY MEDICINE

## 2022-06-17 PROCEDURE — 75571 CT CALCIUM SCORING CARDIAC: ICD-10-PCS | Mod: 26,,, | Performed by: RADIOLOGY

## 2022-06-17 PROCEDURE — 84153 ASSAY OF PSA TOTAL: CPT | Performed by: FAMILY MEDICINE

## 2022-06-17 PROCEDURE — 99211 OFF/OP EST MAY X REQ PHY/QHP: CPT | Mod: S$GLB,,, | Performed by: INTERNAL MEDICINE

## 2022-06-17 PROCEDURE — 75571 CT HRT W/O DYE W/CA TEST: CPT | Mod: TC

## 2022-06-17 PROCEDURE — 85027 COMPLETE CBC AUTOMATED: CPT | Performed by: FAMILY MEDICINE

## 2022-06-17 PROCEDURE — 84443 ASSAY THYROID STIM HORMONE: CPT | Performed by: FAMILY MEDICINE

## 2022-06-17 PROCEDURE — 99999 PR PBB SHADOW E&M-EST. PATIENT-LVL IV: CPT | Mod: PBBFAC,,, | Performed by: FAMILY MEDICINE

## 2022-06-17 PROCEDURE — 75571 CT HRT W/O DYE W/CA TEST: CPT | Mod: 26,,, | Performed by: RADIOLOGY

## 2022-06-17 PROCEDURE — 99211 PR NURSE VISIT, 15 MINS, EXEC HLTH ONLY: ICD-10-PCS | Mod: S$GLB,,, | Performed by: INTERNAL MEDICINE

## 2022-06-17 RX ORDER — ESZOPICLONE 2 MG/1
2 TABLET, FILM COATED ORAL NIGHTLY PRN
COMMUNITY
Start: 2022-03-05

## 2022-06-17 NOTE — PROGRESS NOTES
"Dear Hugh,    I enjoyed seeing you again at your recent Executive Health exam.    This letter and the accompanying report will serve as a summary of the medical history you provided, your physical exam findings, and your test results.    In summary:   Your physical exam was normal. You are in excellent physical shape. Keep up the good work!   Your test results are normal or at least acceptable and do not require further evaluation or treatment at this point.    Details of your test results are included in the accompanying report. Send me a Patient Portal message if you have any questions.    Thanks for letting me care for you, and thanks for trusting [facility] with your healthcare needs.    All the best,     BERYL Franklin MD     ENCLOSURE     EXECUTIVE HEALTH ENCOUNTER      REASON FOR VISIT  EXECUTIVE HEALTH    HEALTH MAINTENANCE INTERVENTIONS - UP TO DATE  Health Maintenance Topics with due status: Not Due       Topic Last Completion Date    Influenza Vaccine 10/24/2020    TETANUS VACCINE 07/06/2021    Lipid Panel 06/17/2022       HEALTH MAINTENANCE INTERVENTIONS - DUE OR DUE SOON  Health Maintenance Due   Topic Date Due    COVID-19 Vaccine (1) Never done       PCP (Primary Care Provider)  I am Hugh's PCP.    DIAGNOSES  The primary encounter diagnosis was Preventative health care. A diagnosis of At increased risk for COVID-19 infection due to non-vaccinated state was also pertinent to this visit.    Problem List Items Addressed This Visit     At increased risk for COVID-19 infection due to non-vaccinated state (Chronic)    Overview     COVID-19 vaccination recommended.             Other Visit Diagnoses     Preventative health care    -  Primary        PHYSICAL EXAM  Vitals:    06/17/22 0836   BP: 122/80   BP Location: Right arm   Patient Position: Sitting   BP Method: Large (Automatic)   Pulse: 61   Temp: 96.6 °F (35.9 °C)   TempSrc: Tympanic   SpO2: 97%   Weight: 71 kg (156 lb 8.4 oz)   Height: 5' 5" (1.651 " m)   CONSTITUTIONAL: No apparent distress. Normal appearance.   EYES: No scleral icterus. Conjunctivae unremarkable, not icteric.  ENT: Right tympanic membrane, ear canal and external ear normal. Left tympanic membrane, ear canal and external ear normal.   NECK: No thyroid mass, thyromegaly or thyroid tenderness. No carotid bruit.   CARDIOVASCULAR: Normal rate and regular rhythm. Normal heart sounds.   PULMONARY: Pulmonary effort is normal. No respiratory distress. Normal breath sounds. No wheezing or rhonchi.   ABDOMINAL: Bowel sounds are normal. There is no distension. Abdomen is soft. There is no mass. There is no abdominal tenderness.   SKIN: Skin is warm and dry. Skin is not jaundiced.   NEUROLOGICAL: No focal deficit apparent. Alert, oriented to person, place, and time.   PSYCHIATRIC: Mood normal. Behavior: Behavior normal. Judgment normal.     DATA SUMMARY    LABORATORY:    CBC: The complete blood count (CBC) checks for anemia and measures the red blood cells, white blood cells, and platelets in your blood.  o YOUR RESULTS: NORMAL or at least ACCEPTABLE and do not require further evaluation or treatment at this point.     CMP: The comprehensive metabolic panel (CMP) checks kidney function, liver function, sodium, potassium, glucose (sugar) and other aspects of your metabolism.  o YOUR RESULTS: NORMAL or at least ACCEPTABLE and do not require further evaluation or treatment at this point.     Lipid Panel: The lipid panel measures the levels of different types of fatty substances in your blood (cholesterol and triglycerides) that can contribute to plaque buildup in your arteries (atherosclerosis).  o YOUR RESULTS: NORMAL.     A1c: The hemoglobin A1c (A1c) is a test that evaluates and screens for diabetes.  o YOUR RESULTS: NORMAL.     TSH: The thyroid is a gland in the neck that helps regulate metabolism. The thyroid stimulating hormone (TSH) is a measure of your thyroid activity.  o YOUR RESULTS:  "NORMAL.     PSA: Prostate specific antigen (PSA) is a test used to screen for and monitor prostate cancer in men.  o YOUR RESULTS: NORMAL.       Want to learn more about your lab results and what they mean?  (1) Log in to your [facility] account at [email or web address]  (2) From the "View test results" tab, click on the test you want to know more about.  (3) Click on the "About This Test" link.     OTHER:   CT Cardiac Calcium Scoring:  This is a test that calculates your risk of developing coronary artery disease (CAD) by measuring the amount of calcified plaque in the coronary arteries.  o YOUR RESULTS: NORMAL.     MEDICATIONS  Outpatient Medications Prior to Visit   Medication Sig Dispense Refill    clindamycin (CLEOCIN T) 1 % external solution APPLY TOPICALLY TO THE AFFECTED AREA DAILY      eszopiclone (LUNESTA) 2 MG Tab Take 2 mg by mouth nightly as needed.      ketoconazole (NIZORAL) 2 % cream 1 application 2 (two) times daily. Apply to affected area       No facility-administered medications prior to visit.   There are no discontinued medications.   FOLLOW-UP  Hugh is to follow up with me for routine Health Maintenance, any unresolved issues, and any new complaints or concerns.    PAST MEDICAL HISTORY  Hugh has no past medical history on file.    SURGICAL HISTORY  Hugh has a past surgical history that includes Reagan tooth extraction and Circumcision.    FAMILY HISTORY  Hugh family history includes Diabetes in his father; Heart disease in his maternal grandfather and paternal grandfather.     ALLERGIES  Review of patient's allergies indicates:  No Known Allergies    SOCIAL HISTORY  Hugh  reports that he has never smoked. He quit smokeless tobacco use about 15 years ago. He reports current alcohol use. He reports that he does not use drugs.     Documentation entered by me for this encounter may have been done in part using speech-recognition technology. Although I have made an effort to ensure accuracy, " ""sound like" errors may exist and should be interpreted in context.       Lab Test Results for Ric Ramos III,  1980    Component Date Value Ref Range    Sodium 2022 141  136 - 145 mmol/L    Potassium 2022 3.9  3.5 - 5.1 mmol/L    Chloride 2022 105  95 - 110 mmol/L    CO2 2022 26  23 - 29 mmol/L    Glucose 2022 87  70 - 110 mg/dL    BUN 2022 27 (A) 6 - 20 mg/dL    Creatinine 2022 1.4  0.5 - 1.4 mg/dL    Calcium 2022 9.7  8.7 - 10.5 mg/dL    Total Protein 2022 7.0  6.0 - 8.4 g/dL    Albumin 2022 4.2  3.5 - 5.2 g/dL    Total Bilirubin 2022 0.9  0.1 - 1.0 mg/dL    Alkaline Phosphatase 2022 44 (A) 55 - 135 U/L    AST 2022 31  10 - 40 U/L    ALT 2022 31  10 - 44 U/L    Anion Gap 2022 10  8 - 16 mmol/L    eGFR if African American 2022 >60  >60 mL/min/1.73 m^2    eGFR if non African American 2022 >60  >60 mL/min/1.73 m^2    WBC 2022 4.71  3.90 - 12.70 K/uL    RBC 2022 4.26 (A) 4.60 - 6.20 M/uL    Hemoglobin 2022 14.6  14.0 - 18.0 g/dL    Hematocrit 2022 42.3  40.0 - 54.0 %    MCV 2022 99 (A) 82 - 98 fL    MCH 2022 34.3 (A) 27.0 - 31.0 pg    MCHC 2022 34.5  32.0 - 36.0 g/dL    RDW 2022 12.1  11.5 - 14.5 %    Platelets 2022 223  150 - 450 K/uL    MPV 2022 10.0  9.2 - 12.9 fL    Cholesterol 2022 183  120 - 199 mg/dL    Triglycerides 2022 80  30 - 150 mg/dL    HDL 2022 42  40 - 75 mg/dL    LDL Cholesterol 2022 125.0  63.0 - 159.0 mg/dL    HDL/Cholesterol Ratio 2022 23.0  20.0 - 50.0 %    Total Cholesterol/HDL Ratio 2022 4.4  2.0 - 5.0    Non-HDL Cholesterol 2022 141  mg/dL    TSH 2022 2.206  0.400 - 4.000 uIU/mL    Hemoglobin A1C 2022 4.9  4.0 - 5.6 %    Estimated Avg Glucose 2022 94  68 - 131 mg/dL    PSA, Screen 2022 0.50  0.00 - 4.00 ng/mL    "       CT Coronary Calcium Score Results for Ric Ramos III,  1980    CT CALCIUM SCORING CARDIAC 2022    IMPRESSION: Your total calcium score is 0. (Very little coronary heart disease risk.)    IMPORTANT INFORMATION ABOUT YOUR SCAN: This information is based on analysis of the coronary arteries only. Calcium deposits do not correspond directly to the percentage of narrowing of the arteries. They do correlate directly to the amount of coronary plaque, and to the risk of future coronary disease. These calcium deposits usually begin to form years before any symptoms develop. Early detection and modification of risk factors, such as smoking and cholesterol intake, and slow progression of coronary artery disease. A low score suggests a low likelihood of coronary artery disease, but does not exclude the possibility of significant coronary artery narrowing. The results should be discussed with your physician taking into account other risk factors such as age, gender, family history, diabetes, smoking or high cholesterol levels. Should you experience chest pain, difficulty breathing, discomfort radiating into her neck or arm, or discomfort combined with lightheadedness, sweating, fainting or nausea, you should seek prompt medical attention.

## 2022-06-17 NOTE — PATIENT INSTRUCTIONS
The COVID-19 vaccinations are helping prevent hospitalization and death from COVID-19. The great majority of people hospitalized with COVID-19 are either unvaccinated or not fully vaccinated. I strongly recommend you get your COVID-19.    Please learn more about the COVID-19 vaccine at [email or web address]. Afterward, please let me know if I can answer any questions you may have about the vaccine. I'll be glad to help.     The quickest and easiest way to schedule an appointment for your COVID-19 vaccination is from the [facility] [facility] or [facility] online at [email or web address]. You can also schedule an appointment for your COVID-19 vaccination by calling [phone].    Please take care of yourself. You are important to me.

## 2022-06-17 NOTE — LETTER
"   07/23/2022        MATTY KITCHEN III   [address]  [place] [address]           Dear Hugh,    I enjoyed seeing you again at your recent [employer] exam.    This letter and the accompanying report will serve as a summary of the medical history you provided, your physical exam findings, and your test results.    In summary:   Your physical exam was normal. You are in excellent physical shape. Keep up the good work!   Your test results are normal or at least acceptable and do not require further evaluation or treatment at this point.    Details of your test results are included in the accompanying report. Send me a Patient Portal message if you have any questions.    Thanks for letting me care for you, and thanks for trusting [facility] with your healthcare needs.    All the best,     BERYL Franklin MD     [facility]     EXECUTIVE HEALTH ENCOUNTER      REASON FOR VISIT  EXECUTIVE HEALTH    HEALTH MAINTENANCE INTERVENTIONS - UP TO DATE  Health Maintenance Topics with due status: Not Due       Topic Last Completion Date    Influenza Vaccine 10/24/2020    TETANUS VACCINE 07/06/2021    Lipid Panel 06/17/2022       HEALTH MAINTENANCE INTERVENTIONS - DUE OR DUE SOON  Health Maintenance Due   Topic Date Due    COVID-19 Vaccine (1) Never done       PCP (Primary Care Provider)  I am Hugh's PCP.    PHYSICAL EXAM  Vitals    06/17/22 0836   BP: 122/80   BP Location: Right arm   Patient Position: Sitting   BP Method: Large (Automatic)   Pulse: 61   Temp: 96.6 °F (35.9 °C)   TempSrc: Tympanic   SpO2: 97%   Weight: 71 kg (156 lb 8.4 oz)   Height: 5' 5" (1.651 m)   CONSTITUTIONAL: No apparent distress. Normal appearance.   EYES: No scleral icterus. Conjunctivae unremarkable, not icteric.  ENT: Right tympanic membrane, ear canal and external ear normal. Left tympanic membrane, ear canal and external ear normal.   NECK: No thyroid mass, thyromegaly or thyroid tenderness. No carotid bruit.   CARDIOVASCULAR: " "Normal rate and regular rhythm. Normal heart sounds.   PULMONARY: Pulmonary effort is normal. No respiratory distress. Normal breath sounds. No wheezing or rhonchi.   ABDOMINAL: Bowel sounds are normal. There is no distension. Abdomen is soft. There is no mass. There is no abdominal tenderness.   SKIN: Skin is warm and dry. Skin is not jaundiced.   NEUROLOGICAL: No focal deficit apparent. Alert, oriented to person, place, and time.   PSYCHIATRIC: Mood normal. Behavior: Behavior normal. Judgment normal.     DATA SUMMARY    LABORATORY:    CBC: The complete blood count (CBC) checks for anemia and measures the red blood cells, white blood cells, and platelets in your blood.  o YOUR RESULTS: NORMAL or at least ACCEPTABLE and do not require further evaluation or treatment at this point.     CMP: The comprehensive metabolic panel (CMP) checks kidney function, liver function, sodium, potassium, glucose (sugar) and other aspects of your metabolism.  o YOUR RESULTS: NORMAL or at least ACCEPTABLE and do not require further evaluation or treatment at this point.     Lipid Panel: The lipid panel measures the levels of different types of fatty substances in your blood (cholesterol and triglycerides) that can contribute to plaque buildup in your arteries (atherosclerosis).  o YOUR RESULTS: NORMAL.     A1c: The hemoglobin A1c (A1c) is a test that evaluates and screens for diabetes.  o YOUR RESULTS: NORMAL.     TSH: The thyroid is a gland in the neck that helps regulate metabolism. The thyroid stimulating hormone (TSH) is a measure of your thyroid activity.  o YOUR RESULTS: NORMAL.     PSA: Prostate specific antigen (PSA) is a test used to screen for and monitor prostate cancer in men.  o YOUR RESULTS: NORMAL.       Want to learn more about your lab results and what they mean?  (1) Log in to your [email or web address] account at [email or web address]  (2) From the "View test results" tab, click on the test you want to know more " "about.  (3) Click on the "About This Test" link.     OTHER:   CT Cardiac Calcium Scoring:  This is a test that calculates your risk of developing coronary artery disease (CAD) by measuring the amount of calcified plaque in the coronary arteries.  o YOUR RESULTS: NORMAL.     MEDICATIONS  Outpatient Medications Prior to Visit   Medication Sig Dispense Refill    clindamycin (CLEOCIN T) 1 % external solution APPLY TOPICALLY TO THE AFFECTED AREA DAILY      eszopiclone (LUNESTA) 2 MG Tab Take 2 mg by mouth nightly as needed.      ketoconazole (NIZORAL) 2 % cream 1 application 2 (two) times daily. Apply to affected area       No facility-administered medications prior to visit.   There are no discontinued medications.    FOLLOW-UP  Hugh is to follow up with me for routine Health Maintenance, any unresolved issues, and any new complaints or concerns.    PAST MEDICAL HISTORY  Hugh has no past medical history on file.    SURGICAL HISTORY  Hugh has a past surgical history that includes [place] tooth extraction and Circumcision.    FAMILY HISTORY  Hugh family history includes Diabetes in his father; Heart disease in his maternal grandfather and paternal grandfather.     ALLERGIES  Review of patient's allergies indicates:  No Known Allergies    SOCIAL HISTORY  Hugh  reports that he has never smoked. He quit smokeless tobacco use about 15 years ago. He reports current alcohol use. He reports that he does not use drugs.     Documentation entered by me for this encounter may have been done in part using speech-recognition technology. Although I have made an effort to ensure accuracy, "sound like" errors may exist and should be interpreted in context.       Lab Test Results for Ric Goffuniquehank BROOKS,  1980    Component Date Value Ref Range    Sodium 2022 141  136 - 145 mmol/L    Potassium 2022 3.9  3.5 - 5.1 mmol/L    Chloride 2022 105  95 - 110 mmol/L    CO2 2022 26  23 - 29 mmol/L    " Glucose 2022 87  70 - 110 mg/dL    BUN 2022 27 (A) 6 - 20 mg/dL    Creatinine 2022 1.4  0.5 - 1.4 mg/dL    Calcium 2022 9.7  8.7 - 10.5 mg/dL    Total Protein 2022 7.0  6.0 - 8.4 g/dL    Albumin 2022 4.2  3.5 - 5.2 g/dL    Total Bilirubin 2022 0.9  0.1 - 1.0 mg/dL    Alkaline Phosphatase 2022 44 (A) 55 - 135 U/L    AST 2022 31  10 - 40 U/L    ALT 2022 31  10 - 44 U/L    Anion Gap 2022 10  8 - 16 mmol/L    eGFR if African American 2022 >60  >60 mL/min/1.73 m^2    eGFR if non African American 2022 >60  >60 mL/min/1.73 m^2    WBC 2022 4.71  3.90 - 12.70 K/uL    RBC 2022 4.26 (A) 4.60 - 6.20 M/uL    Hemoglobin 2022 14.6  14.0 - 18.0 g/dL    Hematocrit 2022 42.3  40.0 - 54.0 %    MCV 2022 99 (A) 82 - 98 fL    MCH 2022 34.3 (A) 27.0 - 31.0 pg    MCHC 2022 34.5  32.0 - 36.0 g/dL    RDW 2022 12.1  11.5 - 14.5 %    Platelets 2022 223  150 - 450 K/uL    MPV 2022 10.0  9.2 - 12.9 fL    Cholesterol 2022 183  120 - 199 mg/dL    Triglycerides 2022 80  30 - 150 mg/dL    HDL 2022 42  40 - 75 mg/dL    LDL Cholesterol 2022 125.0  63.0 - 159.0 mg/dL    HDL/Cholesterol Ratio 2022 23.0  20.0 - 50.0 %    Total Cholesterol/HDL Ratio 2022 4.4  2.0 - 5.0    Non-HDL Cholesterol 2022 141  mg/dL    TSH 2022 2.206  0.400 - 4.000 uIU/mL    Hemoglobin A1C 2022 4.9  4.0 - 5.6 %    Estimated Avg Glucose 2022 94  68 - 131 mg/dL    PSA, Screen 2022 0.50  0.00 - 4.00 ng/mL          CT Coronary Calcium Score Results for Ric Goffuniquehank CECILIA,  1980    CT CALCIUM SCORING CARDIAC 2022    IMPRESSION: Your total calcium score is 0. (Very little coronary heart disease risk.)    IMPORTANT INFORMATION ABOUT YOUR SCAN: This information is based on analysis of the coronary arteries only. Calcium deposits do not  correspond directly to the percentage of narrowing of the arteries. They do correlate directly to the amount of coronary plaque, and to the risk of future coronary disease. These calcium deposits usually begin to form years before any symptoms develop. Early detection and modification of risk factors, such as smoking and cholesterol intake, and slow progression of coronary artery disease. A low score suggests a low likelihood of coronary artery disease, but does not exclude the possibility of significant coronary artery narrowing. The results should be discussed with your physician taking into account other risk factors such as age, gender, family history, diabetes, smoking or high cholesterol levels. Should you experience chest pain, difficulty breathing, discomfort radiating into her neck or arm, or discomfort combined with lightheadedness, sweating, fainting or nausea, you should seek prompt medical attention.

## 2023-05-12 DIAGNOSIS — Z00.00 ROUTINE GENERAL MEDICAL EXAMINATION AT A HEALTH CARE FACILITY: Primary | ICD-10-CM

## 2023-06-09 ENCOUNTER — HOSPITAL ENCOUNTER (OUTPATIENT)
Dept: CARDIOLOGY | Facility: HOSPITAL | Age: 43
Discharge: HOME | End: 2023-06-09
Attending: FAMILY MEDICINE
Payer: COMMERCIAL

## 2023-06-09 ENCOUNTER — CLINICAL SUPPORT (OUTPATIENT)
Dept: INTERNAL MEDICINE | Facility: CLINIC | Age: 43
End: 2023-06-09
Payer: COMMERCIAL

## 2023-06-09 ENCOUNTER — OFFICE VISIT (OUTPATIENT)
Dept: INTERNAL MEDICINE | Facility: CLINIC | Age: 43
End: 2023-06-09
Payer: COMMERCIAL

## 2023-06-09 VITALS
TEMPERATURE: 97 F | BODY MASS INDEX: 27.16 KG/M2 | DIASTOLIC BLOOD PRESSURE: 76 MMHG | SYSTOLIC BLOOD PRESSURE: 124 MMHG | WEIGHT: 163 LBS | HEART RATE: 53 BPM | HEIGHT: 65 IN

## 2023-06-09 DIAGNOSIS — L21.9 SEBORRHEIC DERMATITIS: Chronic | ICD-10-CM

## 2023-06-09 DIAGNOSIS — Z00.00 ROUTINE GENERAL MEDICAL EXAMINATION AT A HEALTH CARE FACILITY: Primary | ICD-10-CM

## 2023-06-09 DIAGNOSIS — Z00.00 ROUTINE GENERAL MEDICAL EXAMINATION AT A HEALTH CARE FACILITY: ICD-10-CM

## 2023-06-09 DIAGNOSIS — I45.10 INCOMPLETE RIGHT BUNDLE BRANCH BLOCK (RBBB): Chronic | ICD-10-CM

## 2023-06-09 DIAGNOSIS — Z00.00 PREVENTATIVE HEALTH CARE: Primary | ICD-10-CM

## 2023-06-09 DIAGNOSIS — F51.04 PSYCHOPHYSIOLOGIC INSOMNIA: Chronic | ICD-10-CM

## 2023-06-09 LAB
ALBUMIN SERPL BCP-MCNC: 4.1 G/DL (ref 3.5–5.2)
ALP SERPL-CCNC: 49 U/L (ref 55–135)
ALT SERPL W/O P-5'-P-CCNC: 38 U/L (ref 10–44)
ANION GAP SERPL CALC-SCNC: 9 MMOL/L (ref 8–16)
AST SERPL-CCNC: 35 U/L (ref 10–40)
BILIRUB SERPL-MCNC: 0.9 MG/DL (ref 0.1–1)
BUN SERPL-MCNC: 27 MG/DL (ref 6–20)
CALCIUM SERPL-MCNC: 9.3 MG/DL (ref 8.7–10.5)
CHLORIDE SERPL-SCNC: 103 MMOL/L (ref 95–110)
CHOLEST SERPL-MCNC: 184 MG/DL (ref 120–199)
CHOLEST/HDLC SERPL: 4.2 {RATIO} (ref 2–5)
CO2 SERPL-SCNC: 27 MMOL/L (ref 23–29)
COMPLEXED PSA SERPL-MCNC: 0.51 NG/ML (ref 0–4)
CREAT SERPL-MCNC: 1.3 MG/DL (ref 0.5–1.4)
ERYTHROCYTE [DISTWIDTH] IN BLOOD BY AUTOMATED COUNT: 12.1 % (ref 11.5–14.5)
EST. GFR  (NO RACE VARIABLE): >60 ML/MIN/1.73 M^2
ESTIMATED AVG GLUCOSE: 94 MG/DL (ref 68–131)
GLUCOSE SERPL-MCNC: 89 MG/DL (ref 70–110)
HBA1C MFR BLD: 4.9 % (ref 4–5.6)
HCT VFR BLD AUTO: 43.4 % (ref 40–54)
HDLC SERPL-MCNC: 44 MG/DL (ref 40–75)
HDLC SERPL: 23.9 % (ref 20–50)
HGB BLD-MCNC: 14.7 G/DL (ref 14–18)
LDLC SERPL CALC-MCNC: 125.2 MG/DL (ref 63–159)
MCH RBC QN AUTO: 33.7 PG (ref 27–31)
MCHC RBC AUTO-ENTMCNC: 33.9 G/DL (ref 32–36)
MCV RBC AUTO: 100 FL (ref 82–98)
NONHDLC SERPL-MCNC: 140 MG/DL
PLATELET # BLD AUTO: 233 K/UL (ref 150–450)
PMV BLD AUTO: 9.9 FL (ref 9.2–12.9)
POTASSIUM SERPL-SCNC: 3.7 MMOL/L (ref 3.5–5.1)
PROT SERPL-MCNC: 7.2 G/DL (ref 6–8.4)
RBC # BLD AUTO: 4.36 M/UL (ref 4.6–6.2)
SODIUM SERPL-SCNC: 139 MMOL/L (ref 136–145)
TRIGL SERPL-MCNC: 74 MG/DL (ref 30–150)
TSH SERPL DL<=0.005 MIU/L-ACNC: 3.25 UIU/ML (ref 0.4–4)
WBC # BLD AUTO: 5.81 K/UL (ref 3.9–12.7)

## 2023-06-09 PROCEDURE — 1160F RVW MEDS BY RX/DR IN RCRD: CPT | Mod: CPTII,S$GLB,, | Performed by: FAMILY MEDICINE

## 2023-06-09 PROCEDURE — 99999 PR PBB SHADOW E&M-EST. PATIENT-LVL III: CPT | Mod: PBBFAC,,, | Performed by: FAMILY MEDICINE

## 2023-06-09 PROCEDURE — 99396 PR PREVENTIVE VISIT,EST,40-64: ICD-10-PCS | Mod: S$GLB,,, | Performed by: FAMILY MEDICINE

## 2023-06-09 PROCEDURE — 80061 LIPID PANEL: CPT | Performed by: FAMILY MEDICINE

## 2023-06-09 PROCEDURE — 83036 HEMOGLOBIN GLYCOSYLATED A1C: CPT | Performed by: FAMILY MEDICINE

## 2023-06-09 PROCEDURE — 1160F PR REVIEW ALL MEDS BY PRESCRIBER/CLIN PHARMACIST DOCUMENTED: ICD-10-PCS | Mod: CPTII,S$GLB,, | Performed by: FAMILY MEDICINE

## 2023-06-09 PROCEDURE — 84443 ASSAY THYROID STIM HORMONE: CPT | Performed by: FAMILY MEDICINE

## 2023-06-09 PROCEDURE — 3074F PR MOST RECENT SYSTOLIC BLOOD PRESSURE < 130 MM HG: ICD-10-PCS | Mod: CPTII,S$GLB,, | Performed by: FAMILY MEDICINE

## 2023-06-09 PROCEDURE — 3008F BODY MASS INDEX DOCD: CPT | Mod: CPTII,S$GLB,, | Performed by: FAMILY MEDICINE

## 2023-06-09 PROCEDURE — 3078F DIAST BP <80 MM HG: CPT | Mod: CPTII,S$GLB,, | Performed by: FAMILY MEDICINE

## 2023-06-09 PROCEDURE — 99999 PR PBB SHADOW E&M-EST. PATIENT-LVL III: ICD-10-PCS | Mod: PBBFAC,,, | Performed by: FAMILY MEDICINE

## 2023-06-09 PROCEDURE — 3008F PR BODY MASS INDEX (BMI) DOCUMENTED: ICD-10-PCS | Mod: CPTII,S$GLB,, | Performed by: FAMILY MEDICINE

## 2023-06-09 PROCEDURE — 3074F SYST BP LT 130 MM HG: CPT | Mod: CPTII,S$GLB,, | Performed by: FAMILY MEDICINE

## 2023-06-09 PROCEDURE — 3044F HG A1C LEVEL LT 7.0%: CPT | Mod: CPTII,S$GLB,, | Performed by: FAMILY MEDICINE

## 2023-06-09 PROCEDURE — 99396 PREV VISIT EST AGE 40-64: CPT | Mod: S$GLB,,, | Performed by: FAMILY MEDICINE

## 2023-06-09 PROCEDURE — 1159F MED LIST DOCD IN RCRD: CPT | Mod: CPTII,S$GLB,, | Performed by: FAMILY MEDICINE

## 2023-06-09 PROCEDURE — 84153 ASSAY OF PSA TOTAL: CPT | Performed by: FAMILY MEDICINE

## 2023-06-09 PROCEDURE — 80053 COMPREHEN METABOLIC PANEL: CPT | Performed by: FAMILY MEDICINE

## 2023-06-09 PROCEDURE — 93005 ELECTROCARDIOGRAM TRACING: CPT

## 2023-06-09 PROCEDURE — 85027 COMPLETE CBC AUTOMATED: CPT | Performed by: FAMILY MEDICINE

## 2023-06-09 PROCEDURE — 1159F PR MEDICATION LIST DOCUMENTED IN MEDICAL RECORD: ICD-10-PCS | Mod: CPTII,S$GLB,, | Performed by: FAMILY MEDICINE

## 2023-06-09 PROCEDURE — 3078F PR MOST RECENT DIASTOLIC BLOOD PRESSURE < 80 MM HG: ICD-10-PCS | Mod: CPTII,S$GLB,, | Performed by: FAMILY MEDICINE

## 2023-06-09 PROCEDURE — 93010 ELECTROCARDIOGRAM REPORT: CPT | Mod: ,,, | Performed by: INTERNAL MEDICINE

## 2023-06-09 PROCEDURE — 3044F PR MOST RECENT HEMOGLOBIN A1C LEVEL <7.0%: ICD-10-PCS | Mod: CPTII,S$GLB,, | Performed by: FAMILY MEDICINE

## 2023-06-09 PROCEDURE — 93010 EKG 12-LEAD: ICD-10-PCS | Mod: ,,, | Performed by: INTERNAL MEDICINE

## 2023-06-09 RX ORDER — ALBUTEROL SULFATE 90 UG/1
AEROSOL, METERED RESPIRATORY (INHALATION)
COMMUNITY
Start: 2023-03-27

## 2023-06-09 NOTE — PROGRESS NOTES
"EXECUTIVE HEALTH ENCOUNTER  6/9/23      REASON FOR VISIT  EXECUTIVE HEALTH    PCP (Primary Care Provider)  I am Hugh's PCP.    HISTORY  Hugh presented today for his executive health exam. He self-reports and appears to be in excellent health, engaging in regular exercise. His body composition is notably better than average. He maintains a physiologically low pulse rate, indicative of his cardiovascular fitness, resulting from consistent participation in a Cross[employer] exercise program.    In December, he experienced an episode of colitis that necessitated a visit to the emergency room. Post-incident, he consulted with a gastroenterologist for further evaluation. A subsequent abdominal CT scan revealed no evidence of residual colitis. He reports no recurrence of symptoms since this event.    He suffers from insomnia but reports satisfactory control with Lunesta 2 mg, used as needed at bedtime. His seborrheic dermatitis is currently being managed by a dermatologist with topical ketoconazole cream and clindamycin solution. According to Hugh, this treatment regimen has been effective.    He reports no other complaints or concerns.      Review of Systems   Respiratory:  Negative for chest tightness and shortness of breath.    Cardiovascular:  Negative for chest pain.   All other systems reviewed and are negative.    ASSESSMENT/PLAN  1. Preventative health care    2. Seborrheic dermatitis of face  Overview:  DERMATOLOGIST: Dr. Alarcon ([facility])      3. Psychophysiologic insomnia    4. Incomplete right bundle branch block (RBBB)  Overview:  Previous ECGs have shown sinus bradycardia with sinus arrhythmia and incomplete right bundle branch block.          PHYSICAL EXAM  Vitals:    06/09/23 0809   BP: 124/76   Pulse: (!) 53   Temp: 97 °F (36.1 °C)   Weight: 73.9 kg (163 lb)   Height: 5' 5" (1.651 m)   Physical Exam  Vitals reviewed.   Constitutional:       General: He is not in acute distress.     Appearance: Normal " [facility] PALLIATIVE CARE SPECIALTY OUTPATIENT INITIAL VISIT     Patient's Name: Leon Borrero  YOB: 1959  MRN:[id number]    Location of visit: [address] [facility]  Rationale for visit: cardiac disease and pulmonary disease  Number of admissions in past 12 months: 5  Last hospitalization: 8/1/2019       PCP: David Alvarado MD    Date of Visit: 8/5/2019    Chief Compliant: Initial Visit, goals of care     History of Past Medical Illness:   60 year old male with a significant cardiac and pulmonary history that includes severe COPD, severe persistent asthma and obstructive sleep apnea on BiPAP, ischemic dilated cardiomyopathy with both systolic and diastolic heart failure, recurrent hospitalizations for atrial flutter/fib with RVR (failed cardioversion), started on amiodarone and still will recurrent arrhthymias. Unfortunately he has not tolerated beta blockers given his underlying COPD. At his last hospitalization palliative care was asked to see patient upon discharge to assist in goals of care and be an added layer of support during this difficult time for patient.     At today's visit patient reports that he is not having any pain. He will experience exertional dyspnea. No shortness of breath at rest. No orthopnea. No cough. Feels his appetite is good. No concerns with constipation and or diarrhea.He recently started with [facility] and Tele-health monitoring. Per chart review first weight 111 kg (244.2 lbs). During the course of the interview he became quite tearful and scared. He feels that he has limited support and is fearful of being alone. He confides in writer reviewing that he has a long standing history of depression and has noticed since the spring of this year to have become much worse. No concerns with being anxious on the edge, irritable, but has noticed lately that his sleep has been somewhat fragmented related to above.  He identifies a number of stressors that has made his  appearance. He is not ill-appearing, toxic-appearing or diaphoretic.   HENT:      Head: Normocephalic and atraumatic.      Right Ear: Tympanic membrane, ear canal and external ear normal.      Left Ear: Tympanic membrane, ear canal and external ear normal.      Ears:      Comments: Hearing grossly intact.  Eyes:      General: No scleral icterus.     Conjunctiva/sclera: Conjunctivae normal.   Neck:      Vascular: No carotid bruit.   Cardiovascular:      Rate and Rhythm: Normal rate and regular rhythm.      Heart sounds: Normal heart sounds.   Pulmonary:      Effort: Pulmonary effort is normal.      Breath sounds: Normal breath sounds.   Abdominal:      General: Bowel sounds are normal. There is no distension.      Palpations: Abdomen is soft. There is no mass.      Tenderness: There is no abdominal tenderness.   Musculoskeletal:         General: No tenderness.      Cervical back: No muscular tenderness.   Lymphadenopathy:      Cervical: No cervical adenopathy.   Skin:     General: Skin is warm and dry.      Coloration: Skin is not jaundiced.   Neurological:      General: No focal deficit present.      Mental Status: He is alert and oriented to person, place, and time.      Cranial Nerves: No cranial nerve deficit.      Gait: Gait normal.   Psychiatric:         Mood and Affect: Mood normal.         Behavior: Behavior normal.         Judgment: Judgment normal.       MEDICATIONS  Hugh has a current medication list which includes the following prescription(s): albuterol, clindamycin, eszopiclone, and ketoconazole.    HEALTH MAINTENANCE AND SCREENINGS - UP TO DATE  Health Maintenance Topics with due status: Not Due       Topic Last Completion Date    TETANUS VACCINE 07/06/2021    Hemoglobin A1c (Diabetic Prevention Screening) 06/09/2023    Lipid Panel 06/09/2023     HEALTH MAINTENANCE AND SCREENINGS - DUE OR DUE SOON  Health Maintenance Due   Topic Date Due    COVID-19 Vaccine (1) Never done     FOLLOW-UP  Hugh is to  depression much worse. The anniversery of his spouse who passed about 1.5 years ago on this date, after her passing finding out while cleaning out some of her personal belongings that he discovered marital infidelity. In addition to this his step children decided to not have any relations with him. This is very hurtful for patient as he felt like he was a good father figure to them and cared for them financially. More recently he reports that in the last couple of days his current girlfriend, Cleo does not want to be in a relationship with him anymore. He blames his chronic illness and spending all his savings and now requiring to be on disability. Although she is agreeable to be his caregiver through [facility] self directed care group. Apparently Cleo is getting paid for providing 12.5 hours a week to help care for the patient. He is uncertain how this will work out given he still cares for his girlfriend very deeply. Writer validated his feeling and provided therapeutic listening. Currently he denies wanting to harm self and/or others. He is agreeable to voluntarily check himself into inpatient behavioral health in the event that would change due to recent suicidal ideation. He is agreeable for further depression screening. He agrees and would like to be seen by a psychiatrist to assist in managing his medications and therapist. \"I really need to talk to someone about all these feelings I have been having. In the past, I have just kept them bottled up so this is hard for me to do.\"      Patient Health Questionnaire (PHQ-9)    Over the last 2 weeks, how often have you been bothered by any of the following problems?    Nearly every day   3  More than half the days   2  Several days   1  Not at all   0    Little interest or pleasure in doing things   3   Feeling down, depressed, or hopeless      3   Trouble falling or staying asleep, or sleeping too much    3   Feeling tired or having little energy   3   Poor  "follow up with me for any health problems or concerns, any abnormal test results, and any age-appropriate health maintenance interventions and screenings that may be due.    PROBLEM LIST  Hugh has Seborrheic dermatitis of face; Unvaccinated for COVID-19; Incomplete right bundle branch block (RBBB); and Psychophysiologic insomnia on their problem list.    PAST MEDICAL HISTORY  Hugh has a past medical history of Incomplete right bundle branch block (RBBB), Psychophysiologic insomnia, and Seborrheic dermatitis of face.    SURGICAL HISTORY  Hugh has a past surgical history that includes [place] tooth extraction and Circumcision.    FAMILY HISTORY  Hugh family history includes Diabetes in his father; Heart disease in his maternal grandfather and paternal grandfather.     ALLERGIES  Hugh reports he has No Known Allergies.    SOCIAL HISTORY  Hugh  reports that he has never smoked. He quit smokeless tobacco use about 16 years ago. He reports current alcohol use. He reports that he does not use drugs.     Documentation entered by me for this encounter may have been done in part using speech-recognition technology. Although I have made an effort to ensure accuracy, "sound like" errors may exist and should be interpreted in context.  " appetite or overeating    3   Feeling bad about yourself--or that you are a failure or have let yourself or your family down        3   Trouble concentrating on things, such as reading the newspaper or watching television  1   Moving or speaking so slowly that other people could have noticed. Or the opposite--being so fidgety or restless that you have been moving around a lot more than usual   2   Thoughts that you would be better off dead, or of hurting yourself in some way 1     Total score: 22/27    Interpretation of Total Score  Total Score Depression Severity  1-4 Minimal depression  5-9 Mild depression  10-14 Moderate depression  15-19 Moderately severe depression  20-27 Severe depression      Review of Systems:   See History of Present Illness. All other systems reviewed and are negative.       ALLERGIES:   Allergen Reactions   • Aspirin ANAPHYLAXIS     THROAT CLOSE UP    • Metoprolol SHORTNESS OF BREATH     Patient states that any beta blocker causes asthma to flare up severely.    • Metformin DIARRHEA   • Piperacillin Sod-Tazobactam So RASH       MEDICATIONS  Reviewed in Epic, notable for:   Current Outpatient Medications   Medication Sig   • enoxaparin (LOVENOX) 100 MG/ML injectable solution Inject 1 ml into the skin every 12 hours or as directed.   • Insulin Lispro (HUMALOG KWIKPEN SC) 5 units given when blood sugar over 300   • AMIODarone (PACERONE,CORDARONE) 200 MG tablet 400mg/day until seen by  from EP.   • predniSONE (DELTASONE) 20 MG tablet Take 2 tablets by mouth daily (with breakfast). Do not start before August 3, 2019.   • exenatide ER (BYDUREON) 2 MG pen-injector Inject 1 syringe SQ once per week   • dilTIAZem (TIAZAC) 240 MG 24 hr capsule Take 1 capsule by mouth daily. Do not start before July 23, 2019.   • potassium CHLORIDE (KLOR-CON M) 20 MEQ tawanna ER tablet Take 2 tablets by mouth daily.   • furosemide (LASIX) 40 MG tablet Take 2 tablets by mouth daily. Can take one extra tablet a  day for weight gain of 3 pounds in 1 to 2 days or 5 pounds in one week.   • budesonide (PULMICORT) 1 MG/2ML nebulizer suspension Take 2 mLs by nebulization 2 times daily.   • warfarin (COUMADIN) 5 MG tablet Take 1 tablet by mouth daily.   • PROAIR  (90 Base) MCG/ACT inhaler INHALE 2 PUFFS INTO THE LUNGS EVERY 4 HOURS AS NEEDED FOR SHORTNESS OF BREATH OR WHEEZING   • atorvastatin (LIPITOR) 80 MG tablet TAKE 1 TABLET BY MOUTH DAILY   • Respiratory Therapy Supplies (NEBULIZER) Device Inhale 1 inhalation into the lungs 4 times daily.   • pantoprazole (PROTONIX) 40 MG tablet Take 1 tablet by mouth nightly.   • HUMALOG MIX 75/25 (75-25) 100 UNIT/ML injectable suspension Inject 40 Units into the skin 2 times daily (before meals).   • albuterol (VENTOLIN) (2.5 MG/3ML) 0.083% nebulizer solution Take 3 mLs by nebulization 4 times daily.   • DISPENSE Please dispense 14 fr coude male long self catheters to use tid for diagnosis of urinary retention R33.9, length of need: indefinite   • tamsulosin (FLOMAX) 0.4 MG Cap Take 1 capsule by mouth daily after a meal.   • clopidogrel (PLAVIX) 75 MG tablet TAKE 1 TABLET BY MOUTH ONCE DAILY   • nicotine (NICODERM) 21 MG/24HR patch Place 1 patch onto the skin daily.   • acetaminophen (TYLENOL) 500 MG tablet Take 1 tablet by mouth every 4 hours as needed for Pain. 2 TABLETS(=1,000MG)   • TESTOSTERONE NA Testosterone injection, unsure of strength, Receives on the 1st and 15 th of each month   • triamcinolone (NASACORT ALLERGY 24HR) 55 MCG/ACT nasal inhaler Spray 1 spray in each nostril 2 times daily.   • montelukast (SINGULAIR) 10 MG tablet TAKE 1 TABLET BY MOUTH EVERY EVENING   • theophylline (UNIPHYL SR) 600 MG 24 hr tablet TAKE 1 TABLET BY MOUTH DAILY.   • losartan (COZAAR) 25 MG tablet Take 25 mg by mouth nightly.    • citalopram (CELEXA) 20 MG tablet Take 20 mg by mouth nightly.    • oxyCODONE/APAP (PERCOCET) 5-325 MG per tablet Take 1 tablet by mouth 2 times daily as needed for  Pain.   • nitroGLYcerin (NITROSTAT) 0.4 MG SL tablet Place 1 tablet under the tongue every 5 minutes as needed for Chest pain.     No current facility-administered medications for this visit.        PAST MEDICAL/SURGICAL HISTORY, SOCIAL HISTORY, FAMILY HISTORY  Reviewed in Epic. Key elements summarized in Palliative Care Domains    OBJECTIVE  deferred     PHYSICAL EXAM:   GEN:  Alert orientated to person, place, time and situation  appears older than stated age, in no acute distress.   MS: Coherent speech. Maintains good eye contact, answers all questions appropriately, affect tearful.     LABS:  Reviewed in Epic and notable for:     Albumin (g/dL)   Date Value   08/02/2019 2.9 (L)   07/20/2019 3.5 (L)   07/02/2019 2.6 (L)   07/01/2019 2.5 (L)     NT proBNP (pg/mL)   Date Value   07/20/2019 1,422 (H)   07/02/2019 1,427 (H)   07/01/2019 3,187 (H)   06/28/2019 3,129 (H)     BUN (mg/dL)   Date Value   08/05/2019 14   08/02/2019 20   08/01/2019 24 (H)   07/22/2019 10     Creatinine (mg/dL)   Date Value   08/05/2019 0.95   08/02/2019 0.97   08/01/2019 0.91   07/22/2019 0.79     GFR Estimate, Non  (no units)   Date Value   08/05/2019 87   08/02/2019 85   08/01/2019 >90   07/22/2019 >90     HGB (g/dL)   Date Value   08/05/2019 14.1   08/02/2019 13.4   08/01/2019 13.8   07/22/2019 12.8 (L)     PLT (K/mcL)   Date Value   08/05/2019 232   08/02/2019 248   08/01/2019 246   07/22/2019 194   05/01/2013 227   04/30/2013 250   04/28/2013 192   04/27/2013 199     Potassium (mmol/L)   Date Value   08/05/2019 4.7   08/02/2019 3.5   08/01/2019 3.8   07/22/2019 3.2 (L)     RBC (mil/mcL)   Date Value   08/05/2019 5.10   08/02/2019 4.85   08/01/2019 5.05   07/22/2019 4.71     Sodium (mmol/L)   Date Value   08/05/2019 141   08/02/2019 140   08/01/2019 134 (L)   07/22/2019 141     WBC (K/mcL)   Date Value   08/05/2019 16.9 (H)   08/02/2019 11.8 (H)   08/01/2019 17.1 (H)   07/22/2019 7.3     TROPONIN I (ng/mL)   Date Value    08/02/2019 0.14 (HH)   08/02/2019 0.17 (HH)   08/02/2019 0.13 (HH)   08/01/2019 0.10 (HH)     Hemoglobin A1C   Date Value   06/26/2019 9.9 % (H)   01/20/2019 9.8 % (H)   09/22/2017 8.0 % A1c (H)   08/04/2015 8.0 % (H)     INR (no units)   Date Value   08/02/2019 2.9   08/01/2019 2.9   07/26/2019 3.3 (A)   07/22/2019 2.7   07/21/2019 3.2     INR-POC (no units)   Date Value   08/05/2019 3.0       IMAGING/PROCEDURES  Reviewed in Epic and notable for:    6/26/19 Cardiac Cath    Right dominant coronary system.  Left main distal 20% stenosis  LAD with long stented segment with no significant in-stent restenosis, proximal LAD calcified plaque/mild stenosis.  Very proximally arising OM1 with patent stent ,circumflex just beyond bifurcation with 70% stenosis (similar to 2015)   RCA with patent stents (proximal to mid and more distally in the midportion), distal RCA mild diffuse irregularity    PLAN:  Images reviewed with interventional cardiology, plan is for continued medical management    6/25/19 ECHO    Limited echocardiogram performed.  Apical aneurysm with thrombus, mild global hypokinesis. Left ventricular ejection fraction, 27 %.  Severely increased left ventricular wall thickness.  Abnormal relaxation, indeterminate LV filling pressure in the setting of atrial fibrillation (E/E prime 20).  (Prior echo with EF of 40%, prior nuclear study with EF of 30%)    3/3/2019 ECHO    Normal left ventricular cavity size. Normal left ventricular wall thickness. Moderately decreased left ventricular systolic function.  Left ventricular ejection fraction, 45 %. Regional wall motion abnormalities (see diagram). Abnormal diastolic function. Normal left  ventricular cavity size. Normal left ventricular wall thickness. Moderately decreased left ventricular systolic function. Left  ventricular ejection fraction, 45 %. Regional wall motion abnormalities (see diagram). Abnormal diastolic function.  Left ventricular apical  thrombus.  Trace-to-mild mitral valve regurgitation.  Trace tricuspid valve regurgitation.  No significant change since the prior study. LV apical thrombus was present in the 2016 study also.    PALLIATIVE CARE ASSESSMENT, BY DOMAIN    Medical    Symptoms:  Pain: absent.   Patient denies pain.  Dyspnea: present, with satisfactory control    Other Symptoms: Depression:  present, with suboptimal control    Functional status: Current  Palliative Performance Scale:  60 (ambulation is reduced, unable to do housework or hobbies, significant disease. Needs occasional assistance. Normal or reduced intake. Fully conscious or confused.)    Prognosis (quality & quantity): Guarded.   Basis for estimate: chart review and clinical assessment  High risk of death within one year? yes      Psychosocial  Current living situation: Resides in his own home on the first level. His girlfriend and caregiver lives upstairs in her own apartment.      Family & support system: Limited support system. He is . This was patient's second marriage. He does have 3 biological children and 3 step children. All alive and well. Since his second wife past 1.5 years ago has not had any close relations with his step children with the exception of one of his grandchildren, Chito who is now 23 years old. His biological children, are Asif, Robetr, and Mami. Asif has not talked to his father in over 5 years. His other 2 adult children Robert and Mami are defined as supportive and live in [place].  Patient also has his father, who resides in [place]. He also has siblings, but have not been to close   Relevant family medical/surgical history:   Family History   Problem Relation Age of Onset   • Other Mother         aneurysm   • Other Father         legally blind     Habits (tobacco, alcohol, drugs): Recently quite smoking in March. Admits he will still sneak one \"here and there, but use to smoke 3 packs a day.\" Does not drink alcohol or do  illicit drugs. If he does drink it is very rare and on special occasions.     Education/occupation history: Did not graduate highschool. Completed the 10th grade at [employer] on original products.  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 Unfortunately in 2010 he had to sell the business due to his health. He is currently on disability.     Spiritual History He was raised Orthodoxy. He does not find himself to be a Latter-day man anymore. He relates this to all the suffering he has experienced in his life and the loss of his spouse.   Sources of Hope, meaning, comfort, strength include \"I have no idea\"  Part of an Organized [employer], importance of this: Not important to him currently. Reports his siblings have tried to get him to come back to [place]. He is resistant to this at the present moment.   Personal spiritual beliefs, relationship with higher power: N/A  Effects on medical care and end of life issues: N/A    Cultural  Cultural factors N/A    Transitions  Continuity health care providers: MD Dr Jayjay Dupont. Dr Haywood   Home care agency: [facility], Tele-health   Additional community resources: [facility] self-directed care     Ethical, Legal  Decision-making capacity: decisional.  Advanced Care Planning Document: available on EPIC.  POAHC: first agent (Cleo), second agent; son  (Robert)   Code Status Preferences:FULL CODE.  Does current code status align with advance directive? Yes      COUNSELING AND CARE COORDINATION    PLAN OF CARE Discussed with palliative care SW, RN and . Staff message to PCP, Chastity Bueno   DISCUSSION Goals:     Patient would like to defer further goals of care conversations until after being seen by behavorial health. This was validated and respected. Patient did review with writer that although he knows he is depressed and not thinking as clearly  as he would normally that he does not want to die and that he does feel like he has a lot of things that he needs to take care of. He is not thrilled to have to have a defibrillator placed and or have an AV node ablation, but is agreeable given other prior medical treatments have failed and he knows he is at high risk for further cardiac arrhythmias and/or sudden cardiac death. He is aware that while undergoing the procedure that he will need to be intubated and vocalizes that his lung function is bad and has talked with his other providers in the past regarding the risk of having difficulty being weaned off the ventilator. However he is agreeable to take the risk on this date. He did state to writer when asked, if he would not be able to be successfully weaned off the ventilator he would never consider have a tracheostomy placed. He would never want to live in a long term care facility if that meant temporary and or permanently. He would like the added support of palliative care .        RECOMMENDATIONS:  1. Severe Depression. PHQ-9 22/27. See HPI. Orders placed. Service to Behavorial Health. Would appreciate Psychiatrist for medication management and cognitive/EMDR therapist for counseling.     Palliative care RN contacted  patient and gave intake number for patient to call behavioral health     2. Goals of care. See discussion above. Will follow up after seen by behavioral health. To be determined.     3. Added emotional support. Palliative Care  to have scheduled visits with patient.       Thank you for allowing Palliative Care assist in the care of Leon MORRISON Adair Rivera MSN, APNP, AGPCNP-BC, [facility]  Palliative Care  T: [phone]        Total face to face encounter time: 120 minutes, with greater than 50% of this time spent counseling and coordinating care. Encounter started at 1300 and Ended at 1600

## 2023-06-09 NOTE — LETTER
Dear Hugh,    I enjoyed seeing you again at your Executive Health exam on 2023.    This letter and the accompanying reports will summarize your medical history, physical exam findings, and test results.    Please send me a [facility] message if I can answer any questions.    Thanks for letting me care for you and trusting [facility] with your healthcare needs.    All the best,     BERYL Franklin MD     ENCLOSURES         Test Results Summary for Ric Ramos III,  1980       CBC: The complete blood count (CBC) checks for anemia and measures your blood's red blood cells, white blood cells, and platelets.  o YOUR RESULTS: Essentially normal     CMP: The comprehensive metabolic panel (CMP) checks kidney function, liver function, sodium, potassium, glucose (sugar), and other aspects of your metabolism.  o YOUR RESULTS: Essentially normal     Lipid Panel: The lipid panel measures the levels of different fatty substances in your blood (cholesterol and triglycerides) that can contribute to plaque buildup in your arteries (atherosclerosis).  o YOUR RESULTS: Normal     TSH: The thyroid is a gland in the neck that helps regulate metabolism. The thyroid stimulating hormone (TSH) is a measure of your thyroid activity.  o YOUR RESULTS: Normal     A1c: The hemoglobin A1c (A1c) is a test that evaluates and screens for diabetes.  o YOUR RESULTS: Normal     PSA: Prostate specific antigen (PSA) is a test used to screen for and monitor prostate cancer in men.  o YOUR RESULTS: Normal     Electrocardiogram: The electrocardiogram (also referred to as ECG or EKG) is a non-invasive test that measures the electrical activity of the heart's conduction system.  o YOUR RESULTS: Abnormal. Your EKG has changed compared with your previous EKG, as we have discussed. As you requested, I entered a referral/consultation order for you to have an appointment with one of our excellent cardiologists to  discuss your EKG result and any additional evaluation needed.

## 2023-06-18 ENCOUNTER — PATIENT MESSAGE (OUTPATIENT)
Dept: INTERNAL MEDICINE | Facility: CLINIC | Age: 43
End: 2023-06-18
Payer: COMMERCIAL

## 2023-06-18 DIAGNOSIS — R94.31 ABNORMAL EKG: Primary | ICD-10-CM

## 2023-06-18 NOTE — PROGRESS NOTES
Please call Hugh to deliver message below and schedule echocardiogram.  Thanks.   --------------------------------------------------------------------------------  Hugh Aquino.    Your EKG has some nonspecific abnormal findings that need further evaluation with an echocardiogram. (An echocardiogram is an ultrasound of your heart. It is a simple, non-invasive test that examines your heart's structure and function.)    Someone from [facility] should be contacting you soon to help schedule your appointment. If you haven't been contacted by within the next 3-4 business days, call [facility] [place] appointment desk ([phone]), and they can help you schedule your echocardiogram.    If your echocardiogram results are OK, then no further evaluation is needed for your EKG findings. If your echocardiogram shows an issue, I'll refer you to one of our excellent cardiologists for evaluation, and they'll take good care of you.    Thanks for letting me care for you, and thanks for trusting [facility] with your healthcare needs.    All the best,    BERYL Franklin MD

## 2023-06-19 ENCOUNTER — TELEPHONE (OUTPATIENT)
Dept: INTERNAL MEDICINE | Facility: CLINIC | Age: 43
End: 2023-06-19
Payer: COMMERCIAL

## 2023-06-19 NOTE — TELEPHONE ENCOUNTER
----- Message from MARY Franklin MD sent at 6/18/2023  7:46 AM CDT -----  Please call Hugh to deliver message below and schedule echocardiogram.  Thanks.   --------------------------------------------------------------------------------  Hugh Aquino.    Your EKG has some nonspecific abnormal findings that need further evaluation with an echocardiogram. (An echocardiogram is an ultrasound of your heart. It is a simple, non-invasive test that examines your heart's structure and function.)    Someone from [facility] should be contacting you soon to help schedule your appointment. If you haven't been contacted by within the next 3-4 business days, call [facility] [place] appointment desk ([phone]), and they can help you schedule your echocardiogram.    If your echocardiogram results are OK, then no further evaluation is needed for your EKG findings. If your echocardiogram shows an issue, I'll refer you to one of our excellent cardiologists for evaluation, and they'll take good care of you.    Thanks for letting me care for you, and thanks for trusting [facility] with your healthcare needs.    All the best,    BERYL Franklin MD

## 2023-06-26 NOTE — TELEPHONE ENCOUNTER
Hugh Aquino.    As you requested, I entered a referral/consultation order for you to have an appointment with one of our excellent cardiologists to discuss your EKG result and any additional evaluation needed.    You can schedule your cardiology appointment from [facility] or by calling our appointment desk at [phone].    All the best,    BERYL Franklin MD      Orders Placed This Encounter   Procedures    Ambulatory referral/consult to Cardiology

## 2024-05-17 DIAGNOSIS — Z00.00 ANNUAL PHYSICAL EXAM: Primary | ICD-10-CM

## 2024-05-21 DIAGNOSIS — Z00.00 ANNUAL PHYSICAL EXAM: Primary | ICD-10-CM

## 2024-08-02 ENCOUNTER — OFFICE VISIT (OUTPATIENT)
Dept: FAMILY MEDICINE | Facility: CLINIC | Age: 44
End: 2024-08-02
Attending: FAMILY MEDICINE
Payer: COMMERCIAL

## 2024-08-02 ENCOUNTER — CLINICAL SUPPORT (OUTPATIENT)
Dept: INTERNAL MEDICINE | Facility: CLINIC | Age: 44
End: 2024-08-02
Attending: FAMILY MEDICINE
Payer: COMMERCIAL

## 2024-08-02 ENCOUNTER — HOSPITAL ENCOUNTER (OUTPATIENT)
Dept: CARDIOLOGY | Facility: HOSPITAL | Age: 44
Discharge: HOME | End: 2024-08-02
Attending: FAMILY MEDICINE
Payer: COMMERCIAL

## 2024-08-02 VITALS
SYSTOLIC BLOOD PRESSURE: 102 MMHG | DIASTOLIC BLOOD PRESSURE: 82 MMHG | HEIGHT: 65 IN | BODY MASS INDEX: 27.44 KG/M2 | HEART RATE: 56 BPM | WEIGHT: 164.69 LBS | OXYGEN SATURATION: 99 %

## 2024-08-02 DIAGNOSIS — Z00.00 ANNUAL PHYSICAL EXAM: ICD-10-CM

## 2024-08-02 DIAGNOSIS — Z00.00 WELLNESS EXAMINATION: Primary | ICD-10-CM

## 2024-08-02 DIAGNOSIS — Z00.00 ANNUAL PHYSICAL EXAM: Primary | ICD-10-CM

## 2024-08-02 LAB
ALBUMIN SERPL BCP-MCNC: 4 G/DL (ref 3.5–5.2)
ALP SERPL-CCNC: 60 U/L (ref 55–135)
ALT SERPL W/O P-5'-P-CCNC: 30 U/L (ref 10–44)
ANION GAP SERPL CALC-SCNC: 7 MMOL/L (ref 8–16)
AST SERPL-CCNC: 24 U/L (ref 10–40)
BILIRUB SERPL-MCNC: 0.4 MG/DL (ref 0.1–1)
BUN SERPL-MCNC: 21 MG/DL (ref 6–20)
CALCIUM SERPL-MCNC: 9.6 MG/DL (ref 8.7–10.5)
CHLORIDE SERPL-SCNC: 107 MMOL/L (ref 95–110)
CHOLEST SERPL-MCNC: 159 MG/DL (ref 120–199)
CHOLEST/HDLC SERPL: 3.8 {RATIO} (ref 2–5)
CO2 SERPL-SCNC: 28 MMOL/L (ref 23–29)
CREAT SERPL-MCNC: 1.3 MG/DL (ref 0.5–1.4)
ERYTHROCYTE [DISTWIDTH] IN BLOOD BY AUTOMATED COUNT: 12.1 % (ref 11.5–14.5)
EST. GFR  (NO RACE VARIABLE): >60 ML/MIN/1.73 M^2
ESTIMATED AVG GLUCOSE: 100 MG/DL (ref 68–131)
GLUCOSE SERPL-MCNC: 90 MG/DL (ref 70–110)
HBA1C MFR BLD: 5.1 % (ref 4–5.6)
HCT VFR BLD AUTO: 44.1 % (ref 40–54)
HDLC SERPL-MCNC: 42 MG/DL (ref 40–75)
HDLC SERPL: 26.4 % (ref 20–50)
HGB BLD-MCNC: 15.4 G/DL (ref 14–18)
LDLC SERPL CALC-MCNC: 104 MG/DL (ref 63–159)
MCH RBC QN AUTO: 34.7 PG (ref 27–31)
MCHC RBC AUTO-ENTMCNC: 34.9 G/DL (ref 32–36)
MCV RBC AUTO: 99 FL (ref 82–98)
NONHDLC SERPL-MCNC: 117 MG/DL
OHS QRS DURATION: 122 MS
OHS QTC CALCULATION: 399 MS
PLATELET # BLD AUTO: 219 K/UL (ref 150–450)
PMV BLD AUTO: 9.9 FL (ref 9.2–12.9)
POTASSIUM SERPL-SCNC: 3.8 MMOL/L (ref 3.5–5.1)
PROT SERPL-MCNC: 6.8 G/DL (ref 6–8.4)
RBC # BLD AUTO: 4.44 M/UL (ref 4.6–6.2)
SODIUM SERPL-SCNC: 142 MMOL/L (ref 136–145)
TESTOST SERPL-MCNC: 594 NG/DL (ref 304–1227)
TRIGL SERPL-MCNC: 65 MG/DL (ref 30–150)
TSH SERPL DL<=0.005 MIU/L-ACNC: 2.21 UIU/ML (ref 0.4–4)
WBC # BLD AUTO: 4.47 K/UL (ref 3.9–12.7)

## 2024-08-02 PROCEDURE — 99999 PR PBB SHADOW E&M-EST. PATIENT-LVL III: CPT | Mod: PBBFAC,,, | Performed by: FAMILY MEDICINE

## 2024-08-02 PROCEDURE — 80053 COMPREHEN METABOLIC PANEL: CPT | Mod: PO | Performed by: FAMILY MEDICINE

## 2024-08-02 PROCEDURE — 84403 ASSAY OF TOTAL TESTOSTERONE: CPT | Performed by: FAMILY MEDICINE

## 2024-08-02 PROCEDURE — 93005 ELECTROCARDIOGRAM TRACING: CPT | Mod: PO

## 2024-08-02 PROCEDURE — 84443 ASSAY THYROID STIM HORMONE: CPT | Performed by: FAMILY MEDICINE

## 2024-08-02 PROCEDURE — 83036 HEMOGLOBIN GLYCOSYLATED A1C: CPT | Performed by: FAMILY MEDICINE

## 2024-08-02 PROCEDURE — 80061 LIPID PANEL: CPT | Performed by: FAMILY MEDICINE

## 2024-08-02 PROCEDURE — 85027 COMPLETE CBC AUTOMATED: CPT | Mod: PO | Performed by: FAMILY MEDICINE

## 2024-08-02 NOTE — PROGRESS NOTES
August 2, 2024                                                                                                                                                                                                                                                                                      Ric Hughfreya Ramos [facility]  [address]  [place] [address]                                                                                                                                                                                                                                                                                                RE: Ric Hughfreya Ramos III                                                        Clinic #:[id number]                                                                                                                                   Dear  Ric Ramos III,                                                                                                                                           Thank you for allowing me to serve you and perform your Executive Health exam on August 2, 2024.   This letter will serve a brief summary of the history, physical findings, and laboratory/studies performed and recommendations at that time.                                                                                         REASON FOR VISIT: Executive Health Preventive Physical Examination    Past Medical History:   Diagnosis Date    Incomplete right bundle branch block (RBBB) 7/6/2021    Previous ECGs have shown sinus bradycardia with sinus arrhythmia and incomplete right bundle branch block.    Psychophysiologic insomnia 6/9/2023    Seborrheic dermatitis of face 7/6/2021    DERMATOLOGIST: Dr. Alarcon ([facility])       Past Surgical History:   Procedure Laterality Date    CIRCUMCISION      WISDOM TOOTH EXTRACTION         Family History   Problem Relation Name Age of Onset    Diabetes  Father      Heart disease Maternal Grandfather      Heart disease Paternal Grandfather      Cancer Neg Hx         Social History     Socioeconomic History    Marital status:     Number of children: 2   Occupational History    Occupation:    Tobacco Use    Smoking status: Never    Smokeless tobacco: Former     Quit date: 6/2/2007   Substance and Sexual Activity    Alcohol use: Yes     Alcohol/week: 0.0 standard drinks of alcohol     Comment: few beers on weekends    Drug use: No    Sexual activity: Yes     Partners: Female   Social History Narrative    [employer] coordinator. , 2 children.     Social Determinants of Health     Financial Resource Strain: Low Risk  (7/30/2024)    Overall Financial Resource Strain (CARDIA)     Difficulty of Paying Living Expenses: Not hard at all   Food Insecurity: No Food Insecurity (7/30/2024)    Hunger Vital Sign     Worried About Running Out of Food in the Last Year: Never true     Ran Out of Food in the Last Year: Never true   Physical Activity: Sufficiently Active (7/30/2024)    Exercise Vital Sign     Days of Exercise per Week: 4 days     Minutes of Exercise per Session: 60 min   Stress: No Stress Concern Present (7/30/2024)    [employer] [place] [employer] - Occupational Stress Questionnaire     Feeling of Stress : Only a little   Housing Stability: Unknown (7/30/2024)    Housing Stability Vital Sign     Unable to Pay for Housing in the Last Year: No       Allergies: Patient has no known allergies.    Current Outpatient Medications   Medication Sig Dispense Refill    albuterol (PROVENTIL/VENTOLIN HFA) 90 mcg/actuation inhaler INHALE 2 PUFFS BY MOUTH EVERY 4 TO 6 HOURS AS NEEDED FOR WHEEZING OR SHORTNESS OF BREATH      clindamycin (CLEOCIN T) 1 % external solution APPLY TOPICALLY TO THE AFFECTED AREA DAILY      eszopiclone (LUNESTA) 2 MG Tab Take 2 mg by mouth nightly as needed.      ketoconazole (NIZORAL) 2 % cream 1 application 2  "(two) times daily. Apply to affected area       No current facility-administered medications for this visit.       REVIEW OF SYSTEMS:  No recent changes in weight, or complaints of fatigue. No recent changes in vision, or hearing. Denies frequent headaches.No recent changes in voice. No new or changing skin lesions. Denies abnormal bruising or bleeding.  Denies chest pain or sensation of skipped beats. No new onset of shortness of breath, or dyspnea on exertion. Denies abdominal discomfort, constipation, diarrhea,or blood in stool. Denies difficulty with urination.   No recent joint swelling or muscle discomfort. Denies pain or weakness in extremeties. No recent loss of balance. Denies problems with sleep or depression.        Remainder of the review of systems without pertinent positves at this time.                                                                              PHYSICAL EXAM:   VITAL SIGNS: /82   Pulse (!) 56   Ht 5' 5" (1.651 m)   Wt 74.7 kg (164 lb 10.9 oz)   SpO2 99%   BMI 27.40 kg/m²   GENERAL APPEARANCE:  Well nourished and normally developed,  pleasant 44 y.o. male, in good spirits.  SKIN: Without rashes or overt lesions.  HEENT: Head normacephalic. There was no scleral icterus. Mucous membranes were moist. Dentition. Neck is supple, no thyromegally, or carotid bruits.  LUNGS: Clear to auscultation bilaterally. Normal respiratory effort.  HEART: Exam reveals regular rate and rhythm. First and second heart sounds normal. No murmurs, rubs or gallops.   ABDOMEN: Soft, non-tender, non-distended. Exam reveals normal bowl sounds, no masses, no organomegaly and no aortic enlargement.    EXTREMITIES:  Nonedematous, both femoral and pedal pulses are normal. No joint stiffness or tenderness. Full range of motion and strength, upper and lower bilaterally.      ASSESSMENT/RECOMMENDATIONS :  Wellness exam    At this time,  you appear to be in good medical condition.    Continue to work on regular " exercise, maintenance of a healthy weight, balanced diet rich in fruits/vegetables and lean protein, and avoidance of unhealthy habits like smoking and excessive alcohol intake.  I look forward to seeing you again next year.    Please contact me should you have any questions or concerns regarding physical findings, or my recommendations.       Sincerely yours,        FLORES Quiñones M.D.  Department of Family Practice  [facility]

## 2025-06-03 DIAGNOSIS — Z00.00 ANNUAL PHYSICAL EXAM: Primary | ICD-10-CM

## 2025-06-13 ENCOUNTER — RESULTS FOLLOW-UP (OUTPATIENT)
Dept: FAMILY MEDICINE | Facility: CLINIC | Age: 45
End: 2025-06-13

## 2025-06-13 ENCOUNTER — CLINICAL SUPPORT (OUTPATIENT)
Dept: INTERNAL MEDICINE | Facility: CLINIC | Age: 45
End: 2025-06-13
Attending: FAMILY MEDICINE
Payer: COMMERCIAL

## 2025-06-13 ENCOUNTER — OFFICE VISIT (OUTPATIENT)
Dept: FAMILY MEDICINE | Facility: CLINIC | Age: 45
End: 2025-06-13
Attending: FAMILY MEDICINE
Payer: COMMERCIAL

## 2025-06-13 ENCOUNTER — HOSPITAL ENCOUNTER (OUTPATIENT)
Dept: CARDIOLOGY | Facility: HOSPITAL | Age: 45
Discharge: HOME | End: 2025-06-13
Attending: FAMILY MEDICINE
Payer: COMMERCIAL

## 2025-06-13 VITALS
HEART RATE: 56 BPM | BODY MASS INDEX: 27.4 KG/M2 | SYSTOLIC BLOOD PRESSURE: 108 MMHG | TEMPERATURE: 98 F | HEIGHT: 65 IN | WEIGHT: 164.44 LBS | DIASTOLIC BLOOD PRESSURE: 78 MMHG | OXYGEN SATURATION: 98 %

## 2025-06-13 DIAGNOSIS — Z00.00 ANNUAL PHYSICAL EXAM: Primary | ICD-10-CM

## 2025-06-13 DIAGNOSIS — Z00.00 ROUTINE PHYSICAL EXAMINATION: Primary | ICD-10-CM

## 2025-06-13 DIAGNOSIS — Z00.00 ANNUAL PHYSICAL EXAM: ICD-10-CM

## 2025-06-13 LAB
ALBUMIN SERPL BCP-MCNC: 4.2 G/DL (ref 3.5–5.2)
ALP SERPL-CCNC: 52 UNIT/L (ref 40–150)
ALT SERPL W/O P-5'-P-CCNC: 42 UNIT/L (ref 10–44)
ANION GAP (OHS): 10 MMOL/L (ref 8–16)
AST SERPL-CCNC: 31 UNIT/L (ref 11–45)
BILIRUB SERPL-MCNC: 0.4 MG/DL (ref 0.1–1)
BUN SERPL-MCNC: 28 MG/DL (ref 6–20)
CALCIUM SERPL-MCNC: 9.4 MG/DL (ref 8.7–10.5)
CHLORIDE SERPL-SCNC: 107 MMOL/L (ref 95–110)
CHOLEST SERPL-MCNC: 177 MG/DL (ref 120–199)
CHOLEST/HDLC SERPL: 4.1 {RATIO} (ref 2–5)
CO2 SERPL-SCNC: 25 MMOL/L (ref 23–29)
CREAT SERPL-MCNC: 1.2 MG/DL (ref 0.5–1.4)
EAG (OHS): 100 MG/DL (ref 68–131)
ERYTHROCYTE [DISTWIDTH] IN BLOOD BY AUTOMATED COUNT: 12.1 % (ref 11.5–14.5)
GFR SERPLBLD CREATININE-BSD FMLA CKD-EPI: >60 ML/MIN/1.73/M2
GLUCOSE SERPL-MCNC: 95 MG/DL (ref 70–110)
HBA1C MFR BLD: 5.1 % (ref 4–5.6)
HCT VFR BLD AUTO: 43.8 % (ref 40–54)
HDLC SERPL-MCNC: 43 MG/DL (ref 40–75)
HDLC SERPL: 24.3 % (ref 20–50)
HGB BLD-MCNC: 15.3 GM/DL (ref 14–18)
LDLC SERPL CALC-MCNC: 119.6 MG/DL (ref 63–159)
MCH RBC QN AUTO: 34.9 PG (ref 27–31)
MCHC RBC AUTO-ENTMCNC: 34.9 G/DL (ref 32–36)
MCV RBC AUTO: 100 FL (ref 82–98)
NONHDLC SERPL-MCNC: 134 MG/DL
OHS QRS DURATION: 120 MS
OHS QTC CALCULATION: 407 MS
PLATELET # BLD AUTO: 217 K/UL (ref 150–450)
PMV BLD AUTO: 10.3 FL (ref 9.2–12.9)
POTASSIUM SERPL-SCNC: 3.9 MMOL/L (ref 3.5–5.1)
PROT SERPL-MCNC: 7.4 GM/DL (ref 6–8.4)
PSA SERPL-MCNC: 0.48 NG/ML
RBC # BLD AUTO: 4.38 M/UL (ref 4.6–6.2)
SODIUM SERPL-SCNC: 142 MMOL/L (ref 136–145)
TESTOST SERPL-MCNC: 814 NG/DL (ref 304–1227)
TRIGL SERPL-MCNC: 72 MG/DL (ref 30–150)
TSH SERPL-ACNC: 2.84 UIU/ML (ref 0.4–4)
WBC # BLD AUTO: 4.81 K/UL (ref 3.9–12.7)

## 2025-06-13 PROCEDURE — 84443 ASSAY THYROID STIM HORMONE: CPT

## 2025-06-13 PROCEDURE — 84403 ASSAY OF TOTAL TESTOSTERONE: CPT

## 2025-06-13 PROCEDURE — 93010 ELECTROCARDIOGRAM REPORT: CPT | Mod: ,,, | Performed by: INTERNAL MEDICINE

## 2025-06-13 PROCEDURE — 84153 ASSAY OF PSA TOTAL: CPT

## 2025-06-13 PROCEDURE — 83036 HEMOGLOBIN GLYCOSYLATED A1C: CPT

## 2025-06-13 PROCEDURE — 93005 ELECTROCARDIOGRAM TRACING: CPT | Mod: PO

## 2025-06-13 PROCEDURE — 80061 LIPID PANEL: CPT

## 2025-06-13 PROCEDURE — 85027 COMPLETE CBC AUTOMATED: CPT | Mod: PO

## 2025-06-13 PROCEDURE — 99999 PR PBB SHADOW E&M-EST. PATIENT-LVL III: CPT | Mod: PBBFAC,,, | Performed by: INTERNAL MEDICINE

## 2025-06-13 PROCEDURE — 84155 ASSAY OF PROTEIN SERUM: CPT | Mod: PO

## 2025-06-13 NOTE — PROGRESS NOTES
June 13, 2025                                                                                                                                                                                                                                                                                      Ric Hughfreya Ramos [facility]  [address]  [place] [address]                                                                                                                                                                                                                                                                                                RE: Ric Ramos III                                                        Clinic #:[id number]                                                                                                                                   Dear  Ric Ramos III,                                                                                                                                           Thank you for allowing me to serve you and perform your Executive Health exam on June 13, 2025.   This letter will serve a brief summary of the history, physical findings, and laboratory/studies performed and recommendations at that time.                                                                                         REASON FOR VISIT: Executive Health Preventive Physical Examination    Past Medical History:   Diagnosis Date    Incomplete right bundle branch block (RBBB) 7/6/2021    Previous ECGs have shown sinus bradycardia with sinus arrhythmia and incomplete right bundle branch block.    Psychophysiologic insomnia 6/9/2023    Seborrheic dermatitis of face 7/6/2021    DERMATOLOGIST: Dr. Alarcon ([facility])       Past Surgical History:   Procedure Laterality Date    CIRCUMCISION      WISDOM TOOTH EXTRACTION         Family History   Problem Relation Name Age of Onset    Diabetes  "Father      Heart disease Maternal Grandfather      Heart disease Paternal Grandfather      Cancer Neg Hx         Social History[1]    Allergies: Patient has no known allergies.    Current Medications[2]    REVIEW OF SYSTEMS:  No recent changes in weight, or complaints of fatigue. No recent changes in vision, or hearing. Denies frequent headaches.No recent changes in voice. No new or changing skin lesions. Denies abnormal bruising or bleeding.  Denies chest pain or sensation of skipped beats. No new onset of shortness of breath, or dyspnea on exertion. Denies abdominal discomfort, constipation, diarrhea,or blood in stool. Denies difficulty with urination.   No recent joint swelling or muscle discomfort. Denies pain or weakness in extremities. No recent loss of balance. Denies problems with sleep or depression.        Remainder of the review of systems without pertinent positives at this time.                                                                              PHYSICAL EXAM:   VITAL SIGNS: /78   Pulse (!) 56   Temp 97.9 °F (36.6 °C) (Oral)   Ht 5' 5" (1.651 m)   Wt 74.6 kg (164 lb 7.4 oz)   SpO2 98%   BMI 27.37 kg/m²   GENERAL APPEARANCE:  Well nourished and normally developed,  pleasant 45 y.o. male, in good spirits.  SKIN: Without rashes or overt lesions.  HEENT: Head normacephalic. There was no scleral icterus. Mucous membranes were moist. Dentition. Neck is supple, no thyromegaly, or carotid bruits.  LUNGS: Clear to auscultation bilaterally. Normal respiratory effort.  HEART: Exam reveals regular rate and rhythm. First and second heart sounds normal. No murmurs, rubs or gallops.   ABDOMEN: Soft, non-tender, non-distended. Exam reveals normal bowl sounds, no masses, no organomegaly and no aortic enlargement.    EXTREMITIES:  Non edematous, both femoral and pedal pulses are normal. No joint stiffness or tenderness. Full range of motion and strength, upper and lower bilaterally.    LAB " DATA/STUDIES REVIEWED:  LABS:  Acceptable   EKG:  No significant change was found     ASSESSMENT/RECOMMENDATIONS :    At this time,  you appear to be in good medical condition.    Your EKG does not show any changes from previous readings, so that is good.  Continue to work on regular exercise, maintenance of a healthy weight, balanced diet rich in fruits/vegetables and lean protein, and avoidance of unhealthy habits like smoking and excessive alcohol intake.  I look forward to seeing you again next year.    Please contact me should you have any questions or concerns regarding physical findings, or my recommendations.       Sincerely yours,         Sang Rossi M.D.  Department of Internal Medicine  [facility]           [1]   Social History  Socioeconomic History    Marital status:     Number of children: 2   Occupational History    Occupation:    Tobacco Use    Smoking status: Never    Smokeless tobacco: Former     Quit date: 6/2/2007   Substance and Sexual Activity    Alcohol use: Yes     Alcohol/week: 0.0 standard drinks of alcohol     Comment: few beers on weekends    Drug use: No    Sexual activity: Yes     Partners: Female   Social History Narrative    [employer] coordinator. , 2 children.     Social Drivers of Health     Financial Resource Strain: Low Risk  (6/12/2025)    Overall Financial Resource Strain (CARDIA)     Difficulty of Paying Living Expenses: Not hard at all   Food Insecurity: No Food Insecurity (6/12/2025)    Hunger Vital Sign     Worried About Running Out of Food in the Last Year: Never true     Ran Out of Food in the Last Year: Never true   Transportation Needs: No Transportation Needs (6/12/2025)    PRAPARE - Transportation     Lack of Transportation (Medical): No     Lack of Transportation (Non-Medical): No   Physical Activity: Sufficiently Active (6/12/2025)    Exercise Vital Sign     Days of Exercise per Week: 5 days     Minutes of  Exercise per Session: 60 min   Stress: No Stress Concern Present (6/12/2025)    [employer] [place] [employer] - Occupational Stress Questionnaire     Feeling of Stress : Only a little   Housing Stability: Low Risk  (6/12/2025)    Housing Stability Vital Sign     Unable to Pay for Housing in the Last Year: No     Number of Times Moved in the Last Year: 0     Homeless in the Last Year: No   [2]   Current Outpatient Medications   Medication Sig Dispense Refill    albuterol (PROVENTIL/VENTOLIN HFA) 90 mcg/actuation inhaler INHALE 2 PUFFS BY MOUTH EVERY 4 TO 6 HOURS AS NEEDED FOR WHEEZING OR SHORTNESS OF BREATH      clindamycin (CLEOCIN T) 1 % external solution APPLY TOPICALLY TO THE AFFECTED AREA DAILY      eszopiclone (LUNESTA) 2 MG Tab Take 2 mg by mouth nightly as needed.      ketoconazole (NIZORAL) 2 % cream 1 application 2 (two) times daily. Apply to affected area       No current facility-administered medications for this visit.